# Patient Record
Sex: MALE | Race: WHITE | NOT HISPANIC OR LATINO | Employment: OTHER | URBAN - METROPOLITAN AREA
[De-identification: names, ages, dates, MRNs, and addresses within clinical notes are randomized per-mention and may not be internally consistent; named-entity substitution may affect disease eponyms.]

---

## 2019-05-31 ENCOUNTER — HOSPITAL ENCOUNTER (INPATIENT)
Facility: CLINIC | Age: 69
LOS: 1 days | Discharge: HOME | DRG: 392 | End: 2019-06-01
Attending: SURGERY | Admitting: SURGERY
Payer: COMMERCIAL

## 2019-05-31 ENCOUNTER — HOSPITAL ENCOUNTER (INPATIENT)
Facility: CLINIC | Age: 69
LOS: 1 days | Discharge: HOME | DRG: 392 | End: 2019-05-31
Attending: SURGERY | Admitting: SURGERY
Payer: COMMERCIAL

## 2019-05-31 VITALS
SYSTOLIC BLOOD PRESSURE: 184 MMHG | HEIGHT: 67 IN | OXYGEN SATURATION: 92 % | DIASTOLIC BLOOD PRESSURE: 88 MMHG | HEART RATE: 64 BPM | TEMPERATURE: 97.2 F | RESPIRATION RATE: 16 BRPM

## 2019-05-31 PROBLEM — R19.00 RETROPERITONEAL MASS: Status: ACTIVE | Noted: 2019-05-31

## 2019-05-31 LAB
ABO + RH BLD: NORMAL
ABO + RH BLD: NORMAL
ALBUMIN SERPL-MCNC: 3.8 G/DL (ref 3.4–5)
ALP SERPL-CCNC: 116 U/L (ref 40–150)
ALT SERPL W P-5'-P-CCNC: 34 U/L (ref 0–70)
ANION GAP SERPL CALCULATED.3IONS-SCNC: 3 MMOL/L (ref 3–14)
AST SERPL W P-5'-P-CCNC: 18 U/L (ref 0–45)
BILIRUB SERPL-MCNC: 0.5 MG/DL (ref 0.2–1.3)
BLD GP AB SCN SERPL QL: NORMAL
BLOOD BANK CMNT PATIENT-IMP: NORMAL
BUN SERPL-MCNC: 21 MG/DL (ref 7–30)
CALCIUM SERPL-MCNC: 9 MG/DL (ref 8.5–10.1)
CHLORIDE SERPL-SCNC: 110 MMOL/L (ref 94–109)
CO2 SERPL-SCNC: 29 MMOL/L (ref 20–32)
CREAT SERPL-MCNC: 1.12 MG/DL (ref 0.66–1.25)
ERYTHROCYTE [DISTWIDTH] IN BLOOD BY AUTOMATED COUNT: 13.3 % (ref 10–15)
GFR SERPL CREATININE-BSD FRML MDRD: 66 ML/MIN/{1.73_M2}
GLUCOSE SERPL-MCNC: 106 MG/DL (ref 70–99)
HCT VFR BLD AUTO: 41.8 % (ref 40–53)
HGB BLD-MCNC: 13.4 G/DL (ref 13.3–17.7)
INR PPP: 1.02 (ref 0.86–1.14)
MCH RBC QN AUTO: 32.2 PG (ref 26.5–33)
MCHC RBC AUTO-ENTMCNC: 32.1 G/DL (ref 31.5–36.5)
MCV RBC AUTO: 101 FL (ref 78–100)
PLATELET # BLD AUTO: 191 10E9/L (ref 150–450)
POTASSIUM SERPL-SCNC: 4.3 MMOL/L (ref 3.4–5.3)
PROT SERPL-MCNC: 7 G/DL (ref 6.8–8.8)
RBC # BLD AUTO: 4.16 10E12/L (ref 4.4–5.9)
SODIUM SERPL-SCNC: 142 MMOL/L (ref 133–144)
SPECIMEN EXP DATE BLD: NORMAL
WBC # BLD AUTO: 7.2 10E9/L (ref 4–11)

## 2019-05-31 PROCEDURE — 85027 COMPLETE CBC AUTOMATED: CPT | Performed by: STUDENT IN AN ORGANIZED HEALTH CARE EDUCATION/TRAINING PROGRAM

## 2019-05-31 PROCEDURE — 25800030 ZZH RX IP 258 OP 636: Performed by: STUDENT IN AN ORGANIZED HEALTH CARE EDUCATION/TRAINING PROGRAM

## 2019-05-31 PROCEDURE — 86901 BLOOD TYPING SEROLOGIC RH(D): CPT | Performed by: STUDENT IN AN ORGANIZED HEALTH CARE EDUCATION/TRAINING PROGRAM

## 2019-05-31 PROCEDURE — 80053 COMPREHEN METABOLIC PANEL: CPT | Performed by: STUDENT IN AN ORGANIZED HEALTH CARE EDUCATION/TRAINING PROGRAM

## 2019-05-31 PROCEDURE — 12000001 ZZH R&B MED SURG/OB UMMC

## 2019-05-31 PROCEDURE — 36415 COLL VENOUS BLD VENIPUNCTURE: CPT | Performed by: STUDENT IN AN ORGANIZED HEALTH CARE EDUCATION/TRAINING PROGRAM

## 2019-05-31 PROCEDURE — 86900 BLOOD TYPING SEROLOGIC ABO: CPT | Performed by: STUDENT IN AN ORGANIZED HEALTH CARE EDUCATION/TRAINING PROGRAM

## 2019-05-31 PROCEDURE — 85610 PROTHROMBIN TIME: CPT | Performed by: STUDENT IN AN ORGANIZED HEALTH CARE EDUCATION/TRAINING PROGRAM

## 2019-05-31 PROCEDURE — 86850 RBC ANTIBODY SCREEN: CPT | Performed by: STUDENT IN AN ORGANIZED HEALTH CARE EDUCATION/TRAINING PROGRAM

## 2019-05-31 PROCEDURE — 25000132 ZZH RX MED GY IP 250 OP 250 PS 637: Performed by: STUDENT IN AN ORGANIZED HEALTH CARE EDUCATION/TRAINING PROGRAM

## 2019-05-31 RX ORDER — ACETAMINOPHEN 325 MG/1
975 TABLET ORAL EVERY 8 HOURS PRN
Status: DISCONTINUED | OUTPATIENT
Start: 2019-05-31 | End: 2019-05-31 | Stop reason: HOSPADM

## 2019-05-31 RX ORDER — NALOXONE HYDROCHLORIDE 0.4 MG/ML
.1-.4 INJECTION, SOLUTION INTRAMUSCULAR; INTRAVENOUS; SUBCUTANEOUS
Status: DISCONTINUED | OUTPATIENT
Start: 2019-05-31 | End: 2019-05-31 | Stop reason: HOSPADM

## 2019-05-31 RX ORDER — ONDANSETRON 4 MG/1
4 TABLET, ORALLY DISINTEGRATING ORAL EVERY 6 HOURS PRN
Status: DISCONTINUED | OUTPATIENT
Start: 2019-05-31 | End: 2019-06-01 | Stop reason: HOSPADM

## 2019-05-31 RX ORDER — AMOXICILLIN 250 MG
2 CAPSULE ORAL 2 TIMES DAILY
Status: DISCONTINUED | OUTPATIENT
Start: 2019-05-31 | End: 2019-06-01 | Stop reason: HOSPADM

## 2019-05-31 RX ORDER — POLYETHYLENE GLYCOL 3350 17 G/17G
17 POWDER, FOR SOLUTION ORAL DAILY PRN
Status: DISCONTINUED | OUTPATIENT
Start: 2019-05-31 | End: 2019-06-01 | Stop reason: HOSPADM

## 2019-05-31 RX ORDER — ONDANSETRON 4 MG/1
4 TABLET, ORALLY DISINTEGRATING ORAL EVERY 6 HOURS PRN
Status: DISCONTINUED | OUTPATIENT
Start: 2019-05-31 | End: 2019-05-31 | Stop reason: HOSPADM

## 2019-05-31 RX ORDER — ONDANSETRON 2 MG/ML
4 INJECTION INTRAMUSCULAR; INTRAVENOUS EVERY 6 HOURS PRN
Status: DISCONTINUED | OUTPATIENT
Start: 2019-05-31 | End: 2019-06-01 | Stop reason: HOSPADM

## 2019-05-31 RX ORDER — PROCHLORPERAZINE 25 MG
12.5 SUPPOSITORY, RECTAL RECTAL EVERY 12 HOURS PRN
Status: DISCONTINUED | OUTPATIENT
Start: 2019-05-31 | End: 2019-06-01 | Stop reason: HOSPADM

## 2019-05-31 RX ORDER — ACETAMINOPHEN 325 MG/1
975 TABLET ORAL EVERY 8 HOURS PRN
Status: DISCONTINUED | OUTPATIENT
Start: 2019-05-31 | End: 2019-06-01 | Stop reason: HOSPADM

## 2019-05-31 RX ORDER — ONDANSETRON 2 MG/ML
4 INJECTION INTRAMUSCULAR; INTRAVENOUS EVERY 6 HOURS PRN
Status: DISCONTINUED | OUTPATIENT
Start: 2019-05-31 | End: 2019-05-31 | Stop reason: HOSPADM

## 2019-05-31 RX ORDER — PROCHLORPERAZINE MALEATE 5 MG
5 TABLET ORAL EVERY 6 HOURS PRN
Status: DISCONTINUED | OUTPATIENT
Start: 2019-05-31 | End: 2019-06-01 | Stop reason: HOSPADM

## 2019-05-31 RX ORDER — AMOXICILLIN 250 MG
1 CAPSULE ORAL 2 TIMES DAILY
Status: DISCONTINUED | OUTPATIENT
Start: 2019-05-31 | End: 2019-06-01 | Stop reason: HOSPADM

## 2019-05-31 RX ORDER — SODIUM CHLORIDE, SODIUM LACTATE, POTASSIUM CHLORIDE, CALCIUM CHLORIDE 600; 310; 30; 20 MG/100ML; MG/100ML; MG/100ML; MG/100ML
INJECTION, SOLUTION INTRAVENOUS CONTINUOUS
Status: DISCONTINUED | OUTPATIENT
Start: 2019-05-31 | End: 2019-06-01 | Stop reason: HOSPADM

## 2019-05-31 RX ORDER — PROCHLORPERAZINE 25 MG
12.5 SUPPOSITORY, RECTAL RECTAL EVERY 12 HOURS PRN
Status: DISCONTINUED | OUTPATIENT
Start: 2019-05-31 | End: 2019-05-31 | Stop reason: HOSPADM

## 2019-05-31 RX ORDER — OXCARBAZEPINE 300 MG/1
300 TABLET, FILM COATED ORAL 2 TIMES DAILY
Status: DISCONTINUED | OUTPATIENT
Start: 2019-06-01 | End: 2019-06-01

## 2019-05-31 RX ORDER — PROCHLORPERAZINE MALEATE 5 MG
5 TABLET ORAL EVERY 6 HOURS PRN
Status: DISCONTINUED | OUTPATIENT
Start: 2019-05-31 | End: 2019-05-31 | Stop reason: HOSPADM

## 2019-05-31 RX ORDER — SODIUM CHLORIDE, SODIUM LACTATE, POTASSIUM CHLORIDE, CALCIUM CHLORIDE 600; 310; 30; 20 MG/100ML; MG/100ML; MG/100ML; MG/100ML
INJECTION, SOLUTION INTRAVENOUS CONTINUOUS
Status: DISCONTINUED | OUTPATIENT
Start: 2019-05-31 | End: 2019-05-31 | Stop reason: HOSPADM

## 2019-05-31 RX ADMIN — SENNOSIDES AND DOCUSATE SODIUM 2 TABLET: 8.6; 5 TABLET ORAL at 22:30

## 2019-05-31 RX ADMIN — SODIUM CHLORIDE, POTASSIUM CHLORIDE, SODIUM LACTATE AND CALCIUM CHLORIDE: 600; 310; 30; 20 INJECTION, SOLUTION INTRAVENOUS at 22:32

## 2019-05-31 ASSESSMENT — ACTIVITIES OF DAILY LIVING (ADL)
COGNITION: 0 - NO COGNITION ISSUES REPORTED
DRESS: 0-->INDEPENDENT
SWALLOWING: 0-->SWALLOWS FOODS/LIQUIDS WITHOUT DIFFICULTY
BATHING: 0-->INDEPENDENT
RETIRED_EATING: 0-->INDEPENDENT
AMBULATION: 0-->INDEPENDENT
RETIRED_COMMUNICATION: 0-->UNDERSTANDS/COMMUNICATES WITHOUT DIFFICULTY
FALL_HISTORY_WITHIN_LAST_SIX_MONTHS: NO
TOILETING: 0-->INDEPENDENT
TRANSFERRING: 0-->INDEPENDENT

## 2019-06-01 VITALS
HEIGHT: 67 IN | OXYGEN SATURATION: 95 % | TEMPERATURE: 97 F | RESPIRATION RATE: 16 BRPM | HEART RATE: 57 BPM | SYSTOLIC BLOOD PRESSURE: 159 MMHG | DIASTOLIC BLOOD PRESSURE: 88 MMHG

## 2019-06-01 LAB
ANION GAP SERPL CALCULATED.3IONS-SCNC: 5 MMOL/L (ref 3–14)
BUN SERPL-MCNC: 20 MG/DL (ref 7–30)
CALCIUM SERPL-MCNC: 8.7 MG/DL (ref 8.5–10.1)
CHLORIDE SERPL-SCNC: 112 MMOL/L (ref 94–109)
CO2 SERPL-SCNC: 25 MMOL/L (ref 20–32)
CREAT SERPL-MCNC: 1.03 MG/DL (ref 0.66–1.25)
ERYTHROCYTE [DISTWIDTH] IN BLOOD BY AUTOMATED COUNT: 13.2 % (ref 10–15)
GFR SERPL CREATININE-BSD FRML MDRD: 73 ML/MIN/{1.73_M2}
GLUCOSE SERPL-MCNC: 122 MG/DL (ref 70–99)
HCT VFR BLD AUTO: 42.5 % (ref 40–53)
HGB BLD-MCNC: 13.1 G/DL (ref 13.3–17.7)
MAGNESIUM SERPL-MCNC: 2.2 MG/DL (ref 1.6–2.3)
MCH RBC QN AUTO: 31.8 PG (ref 26.5–33)
MCHC RBC AUTO-ENTMCNC: 30.8 G/DL (ref 31.5–36.5)
MCV RBC AUTO: 103 FL (ref 78–100)
PHOSPHATE SERPL-MCNC: 3.1 MG/DL (ref 2.5–4.5)
PLATELET # BLD AUTO: 173 10E9/L (ref 150–450)
POTASSIUM SERPL-SCNC: 3.7 MMOL/L (ref 3.4–5.3)
RBC # BLD AUTO: 4.12 10E12/L (ref 4.4–5.9)
SODIUM SERPL-SCNC: 143 MMOL/L (ref 133–144)
WBC # BLD AUTO: 5.4 10E9/L (ref 4–11)

## 2019-06-01 PROCEDURE — 85027 COMPLETE CBC AUTOMATED: CPT | Performed by: STUDENT IN AN ORGANIZED HEALTH CARE EDUCATION/TRAINING PROGRAM

## 2019-06-01 PROCEDURE — 83735 ASSAY OF MAGNESIUM: CPT | Performed by: STUDENT IN AN ORGANIZED HEALTH CARE EDUCATION/TRAINING PROGRAM

## 2019-06-01 PROCEDURE — 25800030 ZZH RX IP 258 OP 636: Performed by: STUDENT IN AN ORGANIZED HEALTH CARE EDUCATION/TRAINING PROGRAM

## 2019-06-01 PROCEDURE — 80048 BASIC METABOLIC PNL TOTAL CA: CPT | Performed by: STUDENT IN AN ORGANIZED HEALTH CARE EDUCATION/TRAINING PROGRAM

## 2019-06-01 PROCEDURE — 25000132 ZZH RX MED GY IP 250 OP 250 PS 637: Performed by: STUDENT IN AN ORGANIZED HEALTH CARE EDUCATION/TRAINING PROGRAM

## 2019-06-01 PROCEDURE — 84100 ASSAY OF PHOSPHORUS: CPT | Performed by: STUDENT IN AN ORGANIZED HEALTH CARE EDUCATION/TRAINING PROGRAM

## 2019-06-01 PROCEDURE — 36415 COLL VENOUS BLD VENIPUNCTURE: CPT | Performed by: STUDENT IN AN ORGANIZED HEALTH CARE EDUCATION/TRAINING PROGRAM

## 2019-06-01 RX ORDER — CLOPIDOGREL BISULFATE 75 MG/1
75 TABLET ORAL DAILY
Status: DISCONTINUED | OUTPATIENT
Start: 2019-06-01 | End: 2019-06-01 | Stop reason: HOSPADM

## 2019-06-01 RX ORDER — OXCARBAZEPINE 300 MG/1
300 TABLET, FILM COATED ORAL EVERY MORNING
COMMUNITY

## 2019-06-01 RX ORDER — PANTOPRAZOLE SODIUM 40 MG/1
40 TABLET, DELAYED RELEASE ORAL
Status: DISCONTINUED | OUTPATIENT
Start: 2019-06-01 | End: 2019-06-01 | Stop reason: HOSPADM

## 2019-06-01 RX ORDER — FINASTERIDE 5 MG/1
5 TABLET, FILM COATED ORAL DAILY
COMMUNITY

## 2019-06-01 RX ORDER — LISINOPRIL 20 MG/1
40 TABLET ORAL DAILY
COMMUNITY

## 2019-06-01 RX ORDER — ASPIRIN 81 MG/1
81 TABLET, CHEWABLE ORAL DAILY
COMMUNITY

## 2019-06-01 RX ORDER — ATORVASTATIN CALCIUM 40 MG/1
40 TABLET, FILM COATED ORAL DAILY
COMMUNITY

## 2019-06-01 RX ORDER — AMLODIPINE BESYLATE 5 MG/1
5 TABLET ORAL DAILY
Status: DISCONTINUED | OUTPATIENT
Start: 2019-06-01 | End: 2019-06-01 | Stop reason: HOSPADM

## 2019-06-01 RX ORDER — GABAPENTIN 300 MG/1
600-900 CAPSULE ORAL AT BEDTIME
COMMUNITY

## 2019-06-01 RX ORDER — FERROUS GLUCONATE 324(38)MG
324 TABLET ORAL DAILY
COMMUNITY
Start: 2017-06-12

## 2019-06-01 RX ORDER — CLOPIDOGREL BISULFATE 75 MG/1
75 TABLET ORAL DAILY
COMMUNITY

## 2019-06-01 RX ORDER — LISINOPRIL 20 MG/1
20 TABLET ORAL DAILY
Status: DISCONTINUED | OUTPATIENT
Start: 2019-06-01 | End: 2019-06-01 | Stop reason: HOSPADM

## 2019-06-01 RX ORDER — OXCARBAZEPINE 600 MG/1
1200 TABLET, FILM COATED ORAL AT BEDTIME
Status: DISCONTINUED | OUTPATIENT
Start: 2019-06-01 | End: 2019-06-01 | Stop reason: HOSPADM

## 2019-06-01 RX ORDER — OXCARBAZEPINE 300 MG/1
1200 TABLET, FILM COATED ORAL AT BEDTIME
COMMUNITY

## 2019-06-01 RX ORDER — METOPROLOL SUCCINATE 50 MG/1
50 TABLET, EXTENDED RELEASE ORAL DAILY
COMMUNITY
Start: 2019-01-16 | End: 2020-01-16

## 2019-06-01 RX ORDER — FAMOTIDINE 20 MG/1
20 TABLET, FILM COATED ORAL 2 TIMES DAILY
COMMUNITY

## 2019-06-01 RX ORDER — ATORVASTATIN CALCIUM 40 MG/1
40 TABLET, FILM COATED ORAL EVERY EVENING
Status: DISCONTINUED | OUTPATIENT
Start: 2019-06-01 | End: 2019-06-01 | Stop reason: HOSPADM

## 2019-06-01 RX ORDER — AMLODIPINE BESYLATE 5 MG/1
5 TABLET ORAL DAILY
COMMUNITY
End: 2023-04-20

## 2019-06-01 RX ORDER — ASPIRIN 81 MG/1
81 TABLET, CHEWABLE ORAL DAILY
Status: DISCONTINUED | OUTPATIENT
Start: 2019-06-01 | End: 2019-06-01 | Stop reason: HOSPADM

## 2019-06-01 RX ORDER — FUROSEMIDE 40 MG
40 TABLET ORAL DAILY
COMMUNITY
Start: 2015-09-10

## 2019-06-01 RX ORDER — LAMOTRIGINE 200 MG/1
400 TABLET ORAL DAILY
COMMUNITY

## 2019-06-01 RX ORDER — OXCARBAZEPINE 300 MG/1
300 TABLET, FILM COATED ORAL DAILY
Status: DISCONTINUED | OUTPATIENT
Start: 2019-06-01 | End: 2019-06-01 | Stop reason: HOSPADM

## 2019-06-01 RX ORDER — FLUTICASONE PROPIONATE 50 MCG
1 SPRAY, SUSPENSION (ML) NASAL DAILY PRN
COMMUNITY

## 2019-06-01 RX ADMIN — OXCARBAZEPINE 300 MG: 300 TABLET, FILM COATED ORAL at 00:49

## 2019-06-01 RX ADMIN — CLOPIDOGREL BISULFATE 75 MG: 75 TABLET, FILM COATED ORAL at 08:35

## 2019-06-01 RX ADMIN — PANTOPRAZOLE SODIUM 40 MG: 40 TABLET, DELAYED RELEASE ORAL at 08:34

## 2019-06-01 RX ADMIN — LISINOPRIL 20 MG: 20 TABLET ORAL at 08:35

## 2019-06-01 RX ADMIN — ASPIRIN 81 MG CHEWABLE TABLET 81 MG: 81 TABLET CHEWABLE at 08:34

## 2019-06-01 RX ADMIN — AMLODIPINE BESYLATE 5 MG: 5 TABLET ORAL at 08:35

## 2019-06-01 RX ADMIN — OXCARBAZEPINE 300 MG: 300 TABLET, FILM COATED ORAL at 08:35

## 2019-06-01 RX ADMIN — SENNOSIDES AND DOCUSATE SODIUM 2 TABLET: 8.6; 5 TABLET ORAL at 08:35

## 2019-06-01 RX ADMIN — SODIUM CHLORIDE, POTASSIUM CHLORIDE, SODIUM LACTATE AND CALCIUM CHLORIDE: 600; 310; 30; 20 INJECTION, SOLUTION INTRAVENOUS at 06:13

## 2019-06-01 ASSESSMENT — ACTIVITIES OF DAILY LIVING (ADL)
ADLS_ACUITY_SCORE: 11
ADLS_ACUITY_SCORE: 13

## 2019-06-01 NOTE — DISCHARGE SUMMARY
"Vital signs:  Temp: 97  F (36.1  C) Temp src: Oral BP: 159/88 Pulse: 57   Resp: 16 SpO2: 95 % O2 Device: None (Room air)   Height: 170.2 cm (5' 7\")    There is no height or weight on file to calculate BMI.    AVSS, hypertensive- home BP meds given & denies pain. Active BS, +flatus & reported blood in stool (MD aware). Discharge instruction given with follow- ups. Daughter coming to pick him up around 1515. Pt getting dressed.     Luis Pinto RN  "

## 2019-06-01 NOTE — CONSULTS
Surgical Oncology Consult     Osei Vogel MRN# [id number]   YOB: 1950 Age: 68 year old   Date of Admission:  5/31/2019    Requesting service/physician: Dr. Liu, General surgery  Reason for consult: Right retroperitoneal mass         Assessment:       Osei Vogel is a 68 year old male who presents with a largely asymptomatic right retroperitoneal mass that was noted incidentally during a work-up for hematochezia.  CT characteristics of this mass as well as its size is strongly suggestive of a lipoma versus a liposarcoma.  It is not likely that it is linked to his abdominal symptoms as these have been chronic in nature, the abdominal pain is on the contralateral side, and there is no evidence of small bowel or colonic irritation in any area adjacent to the mass.  Further work-up as an outpatient is indicated however no urgent intervention required at this time.         Plans:       Patient should be referred to surgical oncology clinic with Dr. Vital or Dr. Carr.    Decision on further work-up with MRI or biopsy can be deferred until that time.    Management of bowel symptoms (diarrhea, constipation, hematochezia) per general surgery.    Okay for discharge from surgical oncology standpoint    Patient discussed with Dr. Sharri Osborne - General/Surgical oncology chief resident - Pager: [phone]           History of Present Illness:     Osei Vogel is a 68 year old male with a past medical history significant for an MI in 2009 status post stents x7 in 3 vessels -currently on Plavix, hypertension, irritable bowel syndrome, hypertension, hyperlipidemia, and bipolar disorder who presents on transfer from the emergency department in [place] for assessment/management of a right retroperitoneal mass.  The patient initially presented to the emergency department after having hard stools and constipation which caused an isolated episode of mild hematochezia on the day prior to  admission.  He has chronic issues with diarrhea/constipation which he attributes to irritable bowel syndrome.  He does not have bleeding frequently or separate from bowel movements.  He denies any perianal/rectal pain.  The patient states that he last had a colonoscopy on 8/30/2017.  At that time he had a tubular adenoma in the descending colon and 3 hyperplastic polyps in the rectosigmoid area.  He was scheduled for a repeat colonoscopy in 5 years.   While in the emergency department in [place], the patient had a CTA abdomen/pelvis which demonstrated a mass in the right retroperitoneum extending from just below the kidney down into the proximal thigh.  It was measured approximately 15 x 11.3 x 27 cm.  He was referred to [facility] for further evaluation and management of this mass.   Of note, the patient states that over the last several years he has had intermittent pain and weakness in the right thigh which is causes leg to give out.  He denies any prior falls attributable to this weakness.  He is treated this with massage therapy and some physical therapy but has had no other formal work-up.  He has chronic mild back pain but does not notice any thigh or abdominal pain on the right side.           Past Medical History:     Patient Active Problem List   Diagnosis     Retroperitoneal mass             Past Surgical History:   No past surgical history on file.          Social History:     Social History     Socioeconomic History     Marital status:      Spouse name: Not on file     Number of children: Not on file     Years of education: Not on file     Highest education level: Not on file   Occupational History     Not on file   Social Needs     Financial resource strain: Not on file     Food insecurity:     Worry: Not on file     Inability: Not on file     Transportation needs:     Medical: Not on file     Non-medical: Not on file   Tobacco Use     Smoking status: Not on file   Substance and  Sexual Activity     Alcohol use: Not on file     Drug use: Not on file     Sexual activity: Not on file   Lifestyle     Physical activity:     Days per week: Not on file     Minutes per session: Not on file     Stress: Not on file   Relationships     Social connections:     Talks on phone: Not on file     Gets together: Not on file     Attends Orthodoxy service: Not on file     Active member of club or organization: Not on file     Attends meetings of clubs or organizations: Not on file     Relationship status: Not on file     Intimate partner violence:     Fear of current or ex partner: Not on file     Emotionally abused: Not on file     Physically abused: Not on file     Forced sexual activity: Not on file   Other Topics Concern     Not on file   Social History Narrative     Not on file             Family History:    his mother had breast cancer (age unknown), and there is some history of colon cancer in his paternal relatives which caused his father to have high risk screening regimen however his father has not had colon cancer before.  Patient has a roughly 40-year history of smoking though he stopped 7 years ago.  He smoked approximately 1 to 2 packs/day.           Allergies:      Allergies   Allergen Reactions     Animal Dander      Cat dander             Medications:        Review of your medicines      UNREVIEWED medicines. Ask your doctor about these medicines      Dose / Directions   ferrous gluconate 324 (38 Fe) MG tablet  Commonly known as:  FERGON      Dose:  324 mg  Take 324 mg by mouth daily  Refills:  0     furosemide 40 MG tablet  Commonly known as:  LASIX      Dose:  40 mg  Take 40 mg by mouth daily  Refills:  0     lamoTRIgine 200 MG tablet  Commonly known as:  LaMICtal      Dose:  400 mg  Take 400 mg by mouth daily  Refills:  0     metoprolol succinate ER 50 MG 24 hr tablet  Commonly known as:  TOPROL-XL      Dose:  50 mg  Take 50 mg by mouth daily  Refills:  0                 Review of Systems:  "  Constitutional: NEGATIVE for fever, chills, change in weight   Skin: NEGATIVE for worrisome rashes, moles or lesions   HEENT: NEGATIVE for vision changes, eye irritation, changes in hearing, voice, swallowing, epistaxis, rhinorrhea, or other ear, mouth and throat problems.  Resp: NEGATIVE for cough, SOB, difficulty breathing.  CV: NEGATIVE for chest pain, palpitations, notable arrythmias.   GI: NEGATIVE for nausea, vomiting, melena,  heartburn, or change in bowel habits. POSITIVE for abdominal pain, hematochezia,  : NEGATIVE for frequency, dysuria, or hematuria   MSK: NEGATIVE for significant arthralgias or myalgia   Neuro: NEGATIVE for weakness, dizziness, numbness, paresthesias, seizures or loss of consciousness.          Physical Exam:   /88 (BP Location: Right arm)   Pulse 57   Temp 97  F (36.1  C) (Oral)   Resp 16   Ht 1.702 m (5' 7\")   SpO2 95%   0 lbs 0 oz  Physical Exam:   General:  Adult  male alert and responsive, NAD.  HEENT:  Normocephalic, atraumatic, PERRLA, EOMI, No oral lesions, no erythema  Respiratory:  CTA bilaterally, no wheezes, crackles.  Cardiovascular:  RRR.  No obvious murmurs.  Abdomen:  Soft, obese abdomen. Mildly tender to palpation in the LUQ and LLQ. No pain in the RLQ.  Extremities:  Warm, dry without peripheral edema. Exam of the bilat lower extremities shows no palpable inguinal lymphadenopathy. Full aROM. 5/5 strength.          Labs:   CBC:   Recent Labs   Lab Test 06/01/19  0749   WBC 5.4   HGB 13.1*          BMP:   Recent Labs   Lab Test 06/01/19  0749      POTASSIUM 3.7   CHLORIDE 112*   CO2 25   BUN 20   CR 1.03   *       LFT:   Recent Labs   Lab Test 05/31/19  2213   AST 18   ALT 34   ALKPHOS 116   BILITOTAL 0.5   PROTTOTAL 7.0   ALBUMIN 3.8       Coags:   Recent Labs   Lab Test 06/01/19  0749 05/31/19  2213   INR  --  1.02    191            Imaging:   CTA Abd/Pelv from [place] 5/31/2019 - imaging available in PACS by " "searching patient's name - read is copied from CareEverywhere  \"Abdomen/pelvis:    The small bowel and colon are normal in caliber without obstruction.  There are  no findings to suggest active gastrointestinal bleeding on arterial and delayed  phases of imaging and there was no hyperattenuating material seen within the  bowel on the noncontrast images.    The liver is diffusely hypoattenuating consistent with hepatic steatosis.  No  focal intrahepatic lesions are seen.  The spleen, gallbladder, adrenal glands,  pancreas and kidneys are normal in appearance.    There is a large, incompletely visualized right retroperitoneal fat containing  mass but which also contains wispy areas of soft tissue and a small amount of  calcification superiorly.  This extends from just below the right para renal  fascia along the right iliopsoas and posterior to the right common femoral  artery and vein into the proximal thigh.  This mass is difficult to measure but  measures at least 15 x 11.3 x 27 cm.  This also exerts mass effect on  peritoneal structures including the bladder.    There is moderate atherosclerotic calcification of the abdominal aorta and  bilateral iliac arteries.  There is an infrarenal abdominal aortic aneurysm  measuring 3.3 cm in anteroposterior diameter and contains some areas of mural  thrombus.  There are single bilateral renal arteries.  The superior mesenteric,  celiac and inferior mesenteric arteries are widely patent.  There is no  retroperitoneal or mesenteric lymphadenopathy.    Bones:    There are multilevel degenerative changes of the spine.  There are no  suspicious osteolytic or osteoblastic lesions.    IMPRESSION:  1. No CT angiographic findings to suggest active gastrointestinal bleed.  2. There is a large 15 x 11.3 x 27 cm right retroperitoneal fat-containing mass  but which also contains wispy areas of soft tissue in a small may diff  calcifications superiorly.  Given the large size and " "location, a  retroperitoneal liposarcoma is in the differential diagnosis.  Surgical  consultation is recommended.  3. There is a 3.3 cm infrarenal abdominal aortic aneurysm.  4. Vascular atherosclerotic calcifications including atherosclerotic  calcification of the right coronary artery.  5. Other nonacute findings as described.\"    For assessment and plan please see above.    Concepcion Osborne   General Surgery Resident  Pager: [phone]  Pt reviewed with Dr. Harrell.   "

## 2019-06-01 NOTE — PROGRESS NOTES
"SPIRITUAL HEALTH SERVICES  SPIRITUAL ASSESSMENT Progress Note  [facility]B   ON-CALL VISIT    REFERRAL SOURCE: Hospital  request upon admission      I met with Osei and oriented him to [facility].    He identified no other spiritual or emotional needs at this time.    Osei and I explored his spiritual framework: \"I used to attend an [employer] for years, and now I have started attending a [employer]ational. I guess you could say I am in the middle of a spiritual transition.\" He identified attending services as an important spiritual practice and has missed \"not being able to attend the past couple of weeks.\" He also finds purpose and meaning being connected to [employer], a \"david-based political activist organization.\" Our visit was interrupted by one of his consulting providers and he found out he may be going home soon, \"maybe I can attend service tomorrow,\" and identified no other spiritual/emotional needs at this time. He is oriented as to how he can request ongoing  support through his nurse.    PLAN: [facility] remains available for ongoing support for the duration of patient's hospitalization.    Deloris Moran  Chaplain Resident  Pager [phone]    [facility] remains available 24/7 for emergent requests/referrals, either by having the switchboard page the on-call  or by entering an ASAP/STAT consult in Epic (this will also page the on-call ).    "

## 2019-06-01 NOTE — H&P
GENERAL SURGERY ADMISSION HISTORY & PHYSICAL   Osei Vogel MRN# [id number]   YOB: 1950 Age: 68 year old     Date of Admission: 5/31/2019    CC: Retroperitoneal mass     HPI: Osei Vogel is a 68 year old year old male with PMH of MI (2009) status post stent x7 on plavix, mild persistent asthma, GERD, IBS, HTN, HLD, and bipolar disorder who presents as a direct admission from [place] with newly found retroperitoneal mass. He was having difficulty passing a bowel movement yesterday evening due to constipation; this was associated with mild rectal bleeding. He reports a small amount dripping into the toilet and a smear on the paper. He presented to the ED in [place] and a CT abd/pelvis was performed which was positive for a large right-sided retroperitoneal mass (15x11.3x27 cm). He was then transferred here for definitive management. Upon interview, he is hemodynamically stable. He denies chest pain, sob, abdominal pain, melena. He does note increasing back pain over the last month (he suffers from chronic back pain). He denies any numbness or tingling, falls. Denies urinary symptoms.     REVIEW OF SYSTEMS: The remainder of the complete ROS was negative unless noted in the HPI. Denies visual changes, headache, sore throat, rhinorrhea, chest pain, sob, abdominal pain,fevers, night sweats, weight loss.     PAST MEDICAL HISTORY:   IBS  GERD   HTN  HLD   Bipolar disorder   CAD, MI s/p 7 stents   [place]   Mild persistent asthma     PAST SURGICAL HISTORY:   Tonsils/Adenoids  Open umbilical hernia repair with mesh (2018)    ALLERGIES:    Cat dander  Tree nuts    HOME MEDICATIONS:   Amlodipine  Aspirin   Lipitor   Vit D3  Plavix   Lasix   Gabapentin   Toprol XL   Lisinopril     SOCIAL HISTORY:   Former smoker 1ppd x45 years, quit 9 years ago   Alcohol: occasional   Retired   Lives in [facility]     FAMILY HISTORY: No family history on file.    PHYSICAL EXAMINATION:  /90   Pulse 64   Temp 97.7  F  "(36.5  C) (Oral)   Resp 16   Ht 1.702 m (5' 7\")   SpO2 95%       General: NAD, awake and alert   CV: Non-cyanotic   Pulm: No increased work of breathing on room air   Abd: soft, mildly distended, non-tender. Umbilical hernia repair intact.   : No cormier   Extremities: WWP without edema  Neuro: No focal deficits noted, patient moves all extremities spontaneously    LABS:  Recent Labs   Lab 05/31/19  2213   WBC 7.2   RBC 4.16*   HGB 13.4   HCT 41.8   *   MCH 32.2   MCHC 32.1   RDW 13.3          Recent Labs   Lab 05/31/19  2213      POTASSIUM 4.3   CHLORIDE 110*   CO2 29   BUN 21   CR 1.12   *   JOSE L 9.0       Recent Labs   Lab 05/31/19  2213   AST 18   ALT 34   ALKPHOS 116   BILITOTAL 0.5   ALBUMIN 3.8   INR 1.02       IMAGING:  CT abd/pelvis 5/31/19 viewable in PACs  Large right-sided retroperitoneal mass (15x11.3x27 cm)          IMPRESSION:  1. No CT angiographic findings to suggest active gastrointestinal bleed.  2. There is a large 15 x 11.3 x 27 cm right retroperitoneal fat-containing mass  but which also contains wispy areas of soft tissue in a small may diff  calcifications superiorly.  Given the large size and location, a  retroperitoneal liposarcoma is in the differential diagnosis.  Surgical  consultation is recommended.  3. There is a 3.3 cm infrarenal abdominal aortic aneurysm.  4. Vascular atherosclerotic calcifications including atherosclerotic  calcification of the right coronary artery.  5. Other nonacute findings as described.     A/P: Osei Vogel is a 68 year old male presenting as direct admission after incidental finding of large right-sided retroperitoneal mass on CT scan.     - Admit to general surgery under Dr. Salcido   - Regular diet, NPO at midnight (for consideration of biopsy tomorrow).    - IVF @ 100  - Pain Management: PRN tylenol   - Nausea Management: PRN zofran   - Bowel Regimen: Senna Docusate, Miralax   - Consults: Will consult surgical oncology to " see patient in the morning.   - Labs: STAT admission labs and AM labs   - Will order PTA meds (including plavix) after pharmacy medication history review.     Discussed with chief and staff.     Lennie Liu MD   Surgery PGY-1

## 2019-06-01 NOTE — DISCHARGE SUMMARY
GENERAL SURGERY DISCHARGE SUMMARY  Patient Name: Osei Vogel  MR#: [id number]  Date of Admission: 5/31/2019  9:19 PM  Date of Discharge: 6/1/2019  Discharging Physician: Dr. Pat Salcido    Discharge Diagnoses:  No diagnosis found.    Procedures Performed this admission:  None      Consultations:  MEDICATION HISTORY IP PHARMACY CONSULT  SURGICAL ONCOLOGY ADULT IP CONSULT    Brief HPI:  Osei Vogel is a 68 year old year old male with PMH of MI (2009) status post stent x7 on plavix, mild persistent asthma, GERD, IBS, HTN, HLD, and bipolar disorder who presents as a direct admission from [place] with newly found retroperitoneal mass. He was having difficulty passing a bowel movement yesterday evening due to constipation; this was associated with mild rectal bleeding. He reports a small amount dripping into the toilet and a smear on the paper. He presented to the ED in [place] and a CT abd/pelvis was performed which was positive for a large right-sided retroperitoneal mass (15x11.3x27 cm). He was then transferred here for definitive management. Upon interview, he is hemodynamically stable. He denies chest pain, sob, abdominal pain, melena. He does note increasing back pain over the last month (he suffers from chronic back pain). He denies any numbness or tingling, falls. Denies urinary symptoms.      Hospital Course:   Osei Vogel was admitted as a direct transfer from an outside hospital after he went to a local ED for rectal bleeding and was found to have a large retroperitoneal mass. He was seen and evaluated by Surgical Oncology who recommended outpatient follow up. Cardiopulmonary and renal status remained stable throughout the admission.     On day of discharge, he was tolerating a regular diet, ambulating, voiding spontaneously without difficulty, and pain was controlled with oral pain medications. The patient was discharged home in stable and improved condition. He will follow up with  "Surgical Oncolocyg clinic in 2-4 weeks.    Pathology:  None    Discharge Exam:  /88 (BP Location: Right arm)   Pulse 57   Temp 97  F (36.1  C) (Oral)   Resp 16   Ht 1.702 m (5' 7\")   SpO2 95%   General: Alert, in no acute distress.  Respiratory: Breathing comfortably.  Cardiovascular: Regular rate and rhythm.   Gastrointestinal: Soft, non-tender, non-distended  Extremities: no edema, wwp    Medications on Discharge:      Review of your medicines      CONTINUE these medicines which have NOT CHANGED      Dose / Directions   amLODIPine 5 MG tablet  Commonly known as:  NORVASC      Dose:  5 mg  Take 5 mg by mouth daily  Refills:  0     aspirin 81 MG chewable tablet  Commonly known as:  ASA      Dose:  81 mg  Take 81 mg by mouth daily  Refills:  0     atorvastatin 40 MG tablet  Commonly known as:  LIPITOR      Dose:  40 mg  Take 40 mg by mouth daily  Refills:  0     cholecalciferol 1000 units Tabs      Dose:  2000 Units  Take 2,000 Units by mouth daily  Refills:  0     clopidogrel 75 MG tablet  Commonly known as:  PLAVIX      Dose:  75 mg  Take 75 mg by mouth daily  Refills:  0     famotidine 20 MG tablet  Commonly known as:  PEPCID      Dose:  20 mg  Take 20 mg by mouth 2 times daily  Refills:  0     ferrous gluconate 324 (38 Fe) MG tablet  Commonly known as:  FERGON      Dose:  324 mg  Take 324 mg by mouth daily  Refills:  0     finasteride 5 MG tablet  Commonly known as:  PROSCAR      Dose:  5 mg  Take 5 mg by mouth daily  Refills:  0     fluticasone 100 MCG/ACT inhaler  Commonly known as:  ARNUITY ELLIPTA      Dose:  1 puff  Inhale 1 puff into the lungs daily  Refills:  0     fluticasone 50 MCG/ACT nasal spray  Commonly known as:  FLONASE      Dose:  1 spray  Spray 1 spray into both nostrils daily as needed for rhinitis or allergies  Refills:  0     furosemide 40 MG tablet  Commonly known as:  LASIX      Dose:  40 mg  Take 40 mg by mouth daily  Refills:  0     gabapentin 300 MG capsule  Commonly known as:  " NEURONTIN      Dose:  600-900 mg  Take 600-900 mg by mouth At Bedtime  Refills:  0     lamoTRIgine 200 MG tablet  Commonly known as:  LaMICtal      Dose:  400 mg  Take 400 mg by mouth daily  Refills:  0     lisinopril 20 MG tablet  Commonly known as:  PRINIVIL/ZESTRIL      Dose:  20 mg  Take 20 mg by mouth daily  Refills:  0     metoprolol succinate ER 50 MG 24 hr tablet  Commonly known as:  TOPROL-XL      Dose:  50 mg  Take 50 mg by mouth daily  Refills:  0     * OXcarbazepine 300 MG tablet  Commonly known as:  TRILEPTAL      Dose:  300 mg  Take 300 mg by mouth every morning  Refills:  0     * OXcarbazepine 300 MG tablet  Commonly known as:  TRILEPTAL      Dose:  1200 mg  Take 1,200 mg by mouth At Bedtime  Refills:  0         * This list has 2 medication(s) that are the same as other medications prescribed for you. Read the directions carefully, and ask your doctor or other care provider to review them with you.              Discharge Procedure Orders   Reason for your hospital stay   Order Comments: Large retroperitoneal mass     Adult [facility] Follow-up and recommended labs and tests   Order Comments: Follow up with Surgical Oncology 2-4 weeks to continue to work up and evaluate retroperitoneal mass.     Appointments on [place] and/or [facility] (with [facility] or [facility] provider or service). Call [phone] if you haven't heard regarding these appointments within 7 days of discharge.     Activity   Order Comments: Your activity upon discharge: activity as tolerated     Order Specific Question Answer Comments   Is discharge order? Yes      Discharge Instructions   Order Comments: May start general diet immediately.    Follow up in Surgical Oncology clinic in 2-4 weeks to continue to evaluate retroperitoneal mass.    Please call if you experience increasing abdominal pain, nausea, vomiting, increasing drainage from your wounds, chills, or fever >101.5.    If you have questions about your follow-up appointment,  please call the General Surgery Clinic at [phone].  Call [phone] and ask to speak with surgery resident if you are having troubles in the evenings, at night, or on weekends.     Diet   Order Comments: Follow this diet upon discharge: Orders Placed This Encounter      Regular Diet Adult     Order Specific Question Answer Comments   Is discharge order? Yes        Seen with staff on the day of discharge    Angel Verduzco, PGY-2  General Surgery

## 2019-06-01 NOTE — PLAN OF CARE
"/90   Pulse 64   Temp 97.7  F (36.5  C) (Oral)   Resp 16   Ht 1.702 m (5' 7\")   SpO2 95%     Activity: Up ad haider    Neuros: A&O x4, calls appropriately. CMS intact   Cardiac: Hypertensive w/ BPs in 160s. Pt states he did not take his BP meds in AM.   Respiratory: Pt denies shortness of breath or chest pain. 95% on RA. LS clear/diminished   GI/: Pt voiding spontaneously. Pt reports no BM since early morning of 5/31. -Bs, +Flatus  Diet: PT NPO for possible procedure in AM   Skin: WNL   Lines: L PIV infusing MIVF @ 100 mL/hr   Pain/nausea: Pt reports tolerable dull Lower left abdominal pain. No nausea   New changes this shift: Pt received Trileptal 300 mg during night  Plan: Continue plan of care     "

## 2019-06-01 NOTE — PHARMACY-ADMISSION MEDICATION HISTORY
Admission medication history interview status for the 5/31/2019 admission is complete. See Epic admission navigator for allergy information, pharmacy, prior to admission medications and immunization status.     Medication history interview sources:  Patient interview    Preferred home pharmacy: [facility] (in [facility]) [phone]    Changes made to PTA medication list (reason)  Added: Everything on this list  Deleted: NA  Changed: NA    Additional medication history information (including reliability of information, actions taken by pharmacist):  ++ Pt was a reliable historian and knew his medications well.      Prior to Admission medications    Medication Sig Last Dose Taking? Auth Provider   amLODIPine (NORVASC) 5 MG tablet Take 5 mg by mouth daily 5/30/2019 at Unknown Yes Unknown, Entered By History   aspirin (ASA) 81 MG chewable tablet Take 81 mg by mouth daily 5/30/2019 at Unknown Yes Unknown, Entered By History   atorvastatin (LIPITOR) 40 MG tablet Take 40 mg by mouth daily 5/30/2019 at Unknown Yes Unknown, Entered By History   cholecalciferol 1000 units TABS Take 2,000 Units by mouth daily 5/30/2019 at Unknown Yes Unknown, Entered By History   clopidogrel (PLAVIX) 75 MG tablet Take 75 mg by mouth daily 5/30/2019 at Unknown Yes Unknown, Entered By History   famotidine (PEPCID) 20 MG tablet Take 20 mg by mouth 2 times daily 5/30/2019 at Unknown Yes Unknown, Entered By History   ferrous gluconate (FERGON) 324 (38 Fe) MG tablet Take 324 mg by mouth daily 5/30/2019 at Unknown Yes Reported, Patient   finasteride (PROSCAR) 5 MG tablet Take 5 mg by mouth daily 5/30/2019 at Unknown Yes Unknown, Entered By History   fluticasone (ARNUITY ELLIPTA) 100 MCG/ACT inhaler Inhale 1 puff into the lungs daily 5/30/2019 at Unknown Yes Unknown, Entered By History   fluticasone (FLONASE) 50 MCG/ACT nasal spray Spray 1 spray into both nostrils daily as needed for rhinitis or allergies Unknown at Unknown Yes Unknown,  Entered By History   furosemide (LASIX) 40 MG tablet Take 40 mg by mouth daily 5/30/2019 at Unknown Yes Reported, Patient   gabapentin (NEURONTIN) 300 MG capsule Take 600-900 mg by mouth At Bedtime 5/29/2019 at Unknown Yes Unknown, Entered By History   lamoTRIgine (LAMICTAL) 200 MG tablet Take 400 mg by mouth daily 5/30/2019 at Unknown Yes Reported, Patient   lisinopril (PRINIVIL/ZESTRIL) 20 MG tablet Take 20 mg by mouth daily 5/30/2019 at Unknown Yes Unknown, Entered By History   metoprolol succinate ER (TOPROL-XL) 50 MG 24 hr tablet Take 50 mg by mouth daily 5/30/2019 at Unknown Yes Reported, Patient   OXcarbazepine (TRILEPTAL) 300 MG tablet Take 300 mg by mouth every morning  5/30/2019 at Unknown Yes Unknown, Entered By History   OXcarbazepine (TRILEPTAL) 300 MG tablet Take 1,200 mg by mouth At Bedtime 5/29/2019 at Unknown Yes Unknown, Entered By History         Medication history completed by:     Bob Teresa, PharmD, BCPS  June 1, 2019

## 2019-06-03 ENCOUNTER — PATIENT OUTREACH (OUTPATIENT)
Dept: SURGERY | Facility: CLINIC | Age: 69
End: 2019-06-03

## 2019-06-03 ENCOUNTER — TELEPHONE (OUTPATIENT)
Dept: ONCOLOGY | Facility: CLINIC | Age: 69
End: 2019-06-03

## 2019-06-03 NOTE — TELEPHONE ENCOUNTER
Scheduling per charles. 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     Is this appointment held on the schedule (Hold will be removed once appt is scheduled)?: No     Is there any additional testing/work up needed prior to visit? No     Has the patient been notified of diagnosis and appointment referral? yes     Were the records sent directly to clinic? Not certain status of records     Has patient been seen for any external appt for this diagnosis [facility]     ADDITIONAL INFORMATION:     ----- Message -----   From: Keli Roca RN   Sent: 6/3/2019   9:59 AM   To: Mercy Feldman RN, *   Subject: Hospital discharge                               Good morning,     I am reviewing this patients chart and it was recommended that the patient consult with Surgical Oncology within the next 2-4 weeks regarding right retroperitoneal mass.     Thanks,   Keli Roca RN, BSN, CNOR, RNFA, CBCN

## 2019-06-03 NOTE — PROGRESS NOTES
RN Post-Op/Post-Discharge Care Coordination Note    Mr. Osei Vogel is a 68 year old male who was discharged from the hospital recently.  He was evaluated for retroperitoneal mass.  Spoke with Patient.    Support  Patient able to care for self independently     Health Status  Fevers/chills: Patient denies any fever or chills.  Nausea/Vomiting: Patient denies nausea/vomiting.  Eating/drinking: Patient is able to eat and drink without any complaints.  Bowel habits: Patient reports having a normal bowel movement. No bleeding.  Drains (CARMEN): N/A  Incisions: No surgery done.    Pain: Abdominal ache- not requiring any analgesics.  New Medications:  None. Resume home medications    Activity/Restrictions  No restrictions    Equipment  None    Pathology reviewed with patient:  N/A    Forms/Letters  No    All of his questions were answered.  He will call this office if he has any further questions and/or concerns.  A message was sent to the Surgical Oncology scheduling team to reach out to the patient.    Whom and When to Call  Patient acknowledges understanding of how to manage any medication changes and   when to seek medical care.     Patient advised that if after hour medical concerns arise to please call [phone] and choose option 4 to speak to the physician on call.

## 2019-06-05 ENCOUNTER — PRE VISIT (OUTPATIENT)
Dept: ONCOLOGY | Facility: CLINIC | Age: 69
End: 2019-06-05

## 2019-06-05 NOTE — TELEPHONE ENCOUNTER
RECORDS STATUS - ALL OTHER DIAGNOSIS      RECORDS RECEIVED FROM: [facility]   DATE RECEIVED: IN CE   NOTES STATUS DETAILS   OFFICE NOTE from referring provider YES CE   OFFICE NOTE from medical oncologist NA    DISCHARGE SUMMARY from hospital YES CE   DISCHARGE REPORT from the ER YES CE   OPERATIVE REPORT YES CE   MEDICATION LIST YES CE   CLINICAL TRIAL TREATMENTS TO DATE NA    LABS YES CE   PATHOLOGY REPORTS NA    ANYTHING RELATED TO DIAGNOSIS NA    GENONOMIC TESTING NA    TYPE:     IMAGING (NEED IMAGES & REPORT) YES    CT SCANS CT IN PACS   MRI NA    MAMMO NA    ULTRASOUND NA    PET NA

## 2019-06-08 ASSESSMENT — ENCOUNTER SYMPTOMS
SWOLLEN GLANDS: 0
ABDOMINAL PAIN: 1
NAIL CHANGES: 0
SPUTUM PRODUCTION: 1
DYSURIA: 0
SORE THROAT: 0
EYE PAIN: 0
ARTHRALGIAS: 1
DYSPNEA ON EXERTION: 1
POLYPHAGIA: 1
MYALGIAS: 0
NECK MASS: 1
MUSCLE CRAMPS: 0
SMELL DISTURBANCE: 0
DIFFICULTY URINATING: 0
HEMATURIA: 0
SINUS PAIN: 0
NIGHT SWEATS: 0
POSTURAL DYSPNEA: 0
POOR WOUND HEALING: 0
HYPERTENSION: 1
BLOATING: 1
DECREASED APPETITE: 0
TASTE DISTURBANCE: 0
HEARTBURN: 1
SKIN CHANGES: 1
STIFFNESS: 0
INCREASED ENERGY: 0
WEIGHT LOSS: 0
JOINT SWELLING: 0
JAUNDICE: 0
SLEEP DISTURBANCES DUE TO BREATHING: 0
LEG PAIN: 1
EXERCISE INTOLERANCE: 0
COUGH: 1
DOUBLE VISION: 0
EYE REDNESS: 1
BACK PAIN: 1
SINUS CONGESTION: 1
TROUBLE SWALLOWING: 0
SYNCOPE: 0
WEIGHT GAIN: 0
HEMOPTYSIS: 0
ALTERED TEMPERATURE REGULATION: 0
NAUSEA: 0
HYPOTENSION: 0
BOWEL INCONTINENCE: 0
BRUISES/BLEEDS EASILY: 0
DIARRHEA: 1
PALPITATIONS: 0
CHILLS: 0
EYE WATERING: 1
CONSTIPATION: 1
BLOOD IN STOOL: 1
LIGHT-HEADEDNESS: 0
FLANK PAIN: 1
HALLUCINATIONS: 0
COUGH DISTURBING SLEEP: 0
SNORES LOUDLY: 1
POLYDIPSIA: 0
MUSCLE WEAKNESS: 0
RECTAL PAIN: 1
FATIGUE: 1
VOMITING: 0
EYE IRRITATION: 1
HOARSE VOICE: 0
ORTHOPNEA: 0
WHEEZING: 0
NECK PAIN: 1
FEVER: 0
SHORTNESS OF BREATH: 1

## 2019-06-14 ENCOUNTER — ONCOLOGY VISIT (OUTPATIENT)
Dept: ONCOLOGY | Facility: CLINIC | Age: 69
End: 2019-06-14
Attending: SURGERY
Payer: COMMERCIAL

## 2019-06-14 VITALS
TEMPERATURE: 98.4 F | HEIGHT: 67 IN | DIASTOLIC BLOOD PRESSURE: 91 MMHG | WEIGHT: 245.1 LBS | RESPIRATION RATE: 16 BRPM | BODY MASS INDEX: 38.47 KG/M2 | SYSTOLIC BLOOD PRESSURE: 197 MMHG | HEART RATE: 63 BPM | OXYGEN SATURATION: 95 %

## 2019-06-14 DIAGNOSIS — R19.00 RETROPERITONEAL MASS: Primary | ICD-10-CM

## 2019-06-14 PROCEDURE — G0463 HOSPITAL OUTPT CLINIC VISIT: HCPCS | Mod: ZF

## 2019-06-14 ASSESSMENT — MIFFLIN-ST. JEOR: SCORE: 1835.52

## 2019-06-14 ASSESSMENT — PAIN SCALES - GENERAL: PAINLEVEL: NO PAIN (0)

## 2019-06-14 NOTE — NURSING NOTE
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    Weight as of this encounter: 111.2 kg (245 lb 1.6 oz). Body surface area is 2.29 meters squared.  No Pain (0) Comment: Data Unavailable   No LMP for male patient.  Allergies reviewed: Yes  Medications reviewed: Yes    Medications: Medication refills not needed today.  Pharmacy name entered into EPIC: Data Unavailable    Clinical concerns: NONE        Abbey Israel, STEPHANIE              "

## 2019-06-14 NOTE — PROGRESS NOTES
HISTORY OF PRESENT ILLNESS:  Osei Vogel is a 68-year-old man who I was asked to see at the request of Dr. Salcido for evaluation of a retroperitoneal lipomatous mass.  He went to the [facility] with episode of hematochezia.  He had a CT scan which demonstrated a large lipomatous mass that extended just below the kidney on the right side and then went over the iliac crest and down the right thigh along the femoral vessels.  He was transferred to the [facility] and then discharged when he had no evidence of any bleeding.  His CT scan shows the mass to be about 27 cm in size.  It does have the characteristics of a benign lipoma or a well-differentiated liposarcoma.  The best I can tell from his history is he is asymptomatic from this mass.  He has had irritable bowel syndrome and it sounds like spinal stenosis in the past and has had some abdominal pain from this, but nothing consistent with his imaging.      PAST MEDICAL HISTORY:  Significant for myocardial infarction in 2009; he had 7 stents placed.  He has a history of hypertension, bipolar disease.  He had an umbilical hernia repair in the past.  On his CT, he is also noted to have a 3.3 cm infrarenal aortic aneurysm.      PHYSICAL EXAMINATION:  He is a well-appearing man, in no apparent distress.  He is obese.  His abdomen is soft, nondistended.  He does not really have any tenderness.  I cannot appreciate a palpable mass in his groin or upper thigh.      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  Differential diagnosis is either a benign lipoma or a well-differentiated liposarcoma.      PLAN:  I talked to him about 2 different strategies.  One would just be to remove this mass, if he is indeed symptomatic.  An alternative approach would be to do a CT-guided biopsy.  If a CT-guided biopsy shows lipoma with no evidence of liposarcoma then I think he could be simply observed a couple of times a year with CT scans.   If the image biopsy suggests a liposarcoma, then we would proceed with resection.  For his situation, resection of a liposarcoma is not without side effects, as I discussed with him, including a myocardial infarction, stroke and nerve injuries.  He wants to proceed with a needle biopsy and be observed if this does not show liposarcoma.      TT:  40 minutes.  CT:  30 minutes.      cc:   Pat Salcido MD   [facility] [address]      Sixto Fuller MD   [place] [phone]

## 2019-06-17 PROBLEM — R07.9 CHEST PAIN: Status: ACTIVE | Noted: 2017-06-11

## 2019-06-17 PROBLEM — D50.9 MICROCYTIC HYPOCHROMIC ANEMIA: Status: ACTIVE | Noted: 2017-06-12

## 2019-06-17 PROBLEM — I25.5 ISCHEMIC CARDIOMYOPATHY: Status: ACTIVE | Noted: 2017-06-12

## 2019-06-19 ENCOUNTER — DOCUMENTATION ONLY (OUTPATIENT)
Dept: INTERVENTIONAL RADIOLOGY/VASCULAR | Facility: CLINIC | Age: 69
End: 2019-06-19

## 2019-06-19 DIAGNOSIS — R19.00 RETROPERITONEAL MASS: Primary | ICD-10-CM

## 2019-06-20 NOTE — PROGRESS NOTES
I spoke with Thor today.  I informed him that he has the following appointments  Tuesday, July 2.  9 AM  Geena Douglass, [facility], first floor, Imaging Center, for a consult  Then he can take the shuttle to the hospital    You are scheduled for your biopsy on  Tuesday, July 2  Report to the [facility] Waiting room at 10:30 AM  The [facility] Waiting Room is located on the 2nd floor (street level) of the [address]  Your procedure is scheduled to start at approximately 12:00 Noon    No solid foods or milk products for 6 hours prior on the day of the procedure 6:00 AM  You may have clear liquids until 2 hours prior on the day of the procedure.(water, apple juice, broth, coffee or tea without milk or sugar, jell-o, white grape juice)  10:00 AM    He was instructed to stop his aspirin and plavix on June 27.    You will need a   (he is arranging for a )    Geena Douglass, CNS  Interventional Radiology

## 2019-06-26 ENCOUNTER — DOCUMENTATION ONLY (OUTPATIENT)
Dept: CARE COORDINATION | Facility: CLINIC | Age: 69
End: 2019-06-26

## 2019-06-28 ENCOUNTER — TELEPHONE (OUTPATIENT)
Dept: INTERVENTIONAL RADIOLOGY/VASCULAR | Facility: CLINIC | Age: 69
End: 2019-06-28

## 2019-06-30 ASSESSMENT — ENCOUNTER SYMPTOMS
DECREASED APPETITE: 0
ORTHOPNEA: 0
SHORTNESS OF BREATH: 1
RECTAL PAIN: 0
INSOMNIA: 1
SPUTUM PRODUCTION: 1
SLEEP DISTURBANCES DUE TO BREATHING: 0
JAUNDICE: 0
MYALGIAS: 0
SYNCOPE: 0
HEMOPTYSIS: 0
LEG PAIN: 1
EYE REDNESS: 1
BRUISES/BLEEDS EASILY: 1
STIFFNESS: 0
HYPERTENSION: 1
EYE WATERING: 1
COUGH DISTURBING SLEEP: 0
DYSPNEA ON EXERTION: 1
FLANK PAIN: 0
EYE IRRITATION: 1
SNORES LOUDLY: 1
WEIGHT GAIN: 0
EYE PAIN: 0
WEIGHT LOSS: 1
FATIGUE: 0
EXERCISE INTOLERANCE: 0
VOMITING: 0
DIARRHEA: 1
HEARTBURN: 0
POLYPHAGIA: 1
HEMATURIA: 0
ALTERED TEMPERATURE REGULATION: 0
BACK PAIN: 1
ABDOMINAL PAIN: 0
DYSURIA: 0
NECK PAIN: 1
MUSCLE CRAMPS: 0
ARTHRALGIAS: 1
POLYDIPSIA: 0
NIGHT SWEATS: 0
CONSTIPATION: 1
NAUSEA: 0
JOINT SWELLING: 1
SWOLLEN GLANDS: 1
LIGHT-HEADEDNESS: 0
POSTURAL DYSPNEA: 0
COUGH: 0
PALPITATIONS: 0
DEPRESSION: 0
MUSCLE WEAKNESS: 0
FEVER: 0
BLOATING: 0
PANIC: 0
INCREASED ENERGY: 0
DOUBLE VISION: 0
HALLUCINATIONS: 1
WHEEZING: 0
DIFFICULTY URINATING: 0
HYPOTENSION: 0
DECREASED CONCENTRATION: 0
CHILLS: 0
BOWEL INCONTINENCE: 0
NERVOUS/ANXIOUS: 0

## 2019-07-02 ENCOUNTER — APPOINTMENT (OUTPATIENT)
Dept: INTERVENTIONAL RADIOLOGY/VASCULAR | Facility: CLINIC | Age: 69
End: 2019-07-02
Attending: CLINICAL NURSE SPECIALIST
Payer: COMMERCIAL

## 2019-07-02 ENCOUNTER — APPOINTMENT (OUTPATIENT)
Dept: MEDSURG UNIT | Facility: CLINIC | Age: 69
End: 2019-07-02
Attending: RADIOLOGY
Payer: COMMERCIAL

## 2019-07-02 ENCOUNTER — OFFICE VISIT (OUTPATIENT)
Dept: INTERVENTIONAL RADIOLOGY/VASCULAR | Facility: CLINIC | Age: 69
End: 2019-07-02
Payer: COMMERCIAL

## 2019-07-02 ENCOUNTER — HOSPITAL ENCOUNTER (OUTPATIENT)
Facility: CLINIC | Age: 69
Discharge: HOME | End: 2019-07-02
Attending: RADIOLOGY | Admitting: RADIOLOGY
Payer: COMMERCIAL

## 2019-07-02 VITALS
HEART RATE: 58 BPM | SYSTOLIC BLOOD PRESSURE: 135 MMHG | WEIGHT: 234 LBS | BODY MASS INDEX: 36.64 KG/M2 | OXYGEN SATURATION: 97 % | DIASTOLIC BLOOD PRESSURE: 84 MMHG

## 2019-07-02 VITALS
SYSTOLIC BLOOD PRESSURE: 113 MMHG | RESPIRATION RATE: 16 BRPM | WEIGHT: 233.91 LBS | TEMPERATURE: 98.6 F | DIASTOLIC BLOOD PRESSURE: 64 MMHG | HEIGHT: 67 IN | BODY MASS INDEX: 36.71 KG/M2 | OXYGEN SATURATION: 98 % | HEART RATE: 55 BPM

## 2019-07-02 DIAGNOSIS — R19.00 RETROPERITONEAL MASS: Primary | ICD-10-CM

## 2019-07-02 DIAGNOSIS — R19.00 RETROPERITONEAL MASS: ICD-10-CM

## 2019-07-02 LAB
BASOPHILS # BLD AUTO: 0.1 10E9/L (ref 0–0.2)
BASOPHILS NFR BLD AUTO: 1 %
DIFFERENTIAL METHOD BLD: ABNORMAL
EOSINOPHIL # BLD AUTO: 0.4 10E9/L (ref 0–0.7)
EOSINOPHIL NFR BLD AUTO: 6 %
ERYTHROCYTE [DISTWIDTH] IN BLOOD BY AUTOMATED COUNT: 12.9 % (ref 10–15)
HCT VFR BLD AUTO: 43.2 % (ref 40–53)
HGB BLD-MCNC: 13.4 G/DL (ref 13.3–17.7)
IMM GRANULOCYTES # BLD: 0 10E9/L (ref 0–0.4)
IMM GRANULOCYTES NFR BLD: 0.3 %
INR PPP: 0.99 (ref 0.86–1.14)
LYMPHOCYTES # BLD AUTO: 1.6 10E9/L (ref 0.8–5.3)
LYMPHOCYTES NFR BLD AUTO: 28 %
MCH RBC QN AUTO: 31.9 PG (ref 26.5–33)
MCHC RBC AUTO-ENTMCNC: 31 G/DL (ref 31.5–36.5)
MCV RBC AUTO: 103 FL (ref 78–100)
MONOCYTES # BLD AUTO: 0.5 10E9/L (ref 0–1.3)
MONOCYTES NFR BLD AUTO: 9.1 %
NEUTROPHILS # BLD AUTO: 3.3 10E9/L (ref 1.6–8.3)
NEUTROPHILS NFR BLD AUTO: 55.6 %
NRBC # BLD AUTO: 0 10*3/UL
NRBC BLD AUTO-RTO: 0 /100
PLATELET # BLD AUTO: 190 10E9/L (ref 150–450)
RBC # BLD AUTO: 4.2 10E12/L (ref 4.4–5.9)
WBC # BLD AUTO: 5.9 10E9/L (ref 4–11)

## 2019-07-02 PROCEDURE — 25000128 H RX IP 250 OP 636: Performed by: PHYSICIAN ASSISTANT

## 2019-07-02 PROCEDURE — 00000468 ZZHCL STATISTIC CYTO QA-OR TOUCH APT TC 88333: Performed by: SURGERY

## 2019-07-02 PROCEDURE — 85610 PROTHROMBIN TIME: CPT | Performed by: PHYSICIAN ASSISTANT

## 2019-07-02 PROCEDURE — 49180 BIOPSY ABDOMINAL MASS: CPT

## 2019-07-02 PROCEDURE — 27210909 ZZH NEEDLE CR5

## 2019-07-02 PROCEDURE — 27210903 ZZH KIT CR5

## 2019-07-02 PROCEDURE — 88305 TISSUE EXAM BY PATHOLOGIST: CPT | Performed by: SURGERY

## 2019-07-02 PROCEDURE — 25800030 ZZH RX IP 258 OP 636: Performed by: PHYSICIAN ASSISTANT

## 2019-07-02 PROCEDURE — 25000125 ZZHC RX 250: Performed by: PHYSICIAN ASSISTANT

## 2019-07-02 PROCEDURE — 40000166 ZZH STATISTIC PP CARE STAGE 1

## 2019-07-02 PROCEDURE — 85025 COMPLETE CBC W/AUTO DIFF WBC: CPT | Performed by: PHYSICIAN ASSISTANT

## 2019-07-02 RX ORDER — FENTANYL CITRATE 50 UG/ML
25-50 INJECTION, SOLUTION INTRAMUSCULAR; INTRAVENOUS EVERY 5 MIN PRN
Status: DISCONTINUED | OUTPATIENT
Start: 2019-07-02 | End: 2019-07-02 | Stop reason: HOSPADM

## 2019-07-02 RX ORDER — SODIUM CHLORIDE 9 MG/ML
INJECTION, SOLUTION INTRAVENOUS CONTINUOUS
Status: DISCONTINUED | OUTPATIENT
Start: 2019-07-02 | End: 2019-07-02 | Stop reason: HOSPADM

## 2019-07-02 RX ORDER — NALOXONE HYDROCHLORIDE 0.4 MG/ML
.1-.4 INJECTION, SOLUTION INTRAMUSCULAR; INTRAVENOUS; SUBCUTANEOUS
Status: DISCONTINUED | OUTPATIENT
Start: 2019-07-02 | End: 2019-07-02 | Stop reason: HOSPADM

## 2019-07-02 RX ORDER — LIDOCAINE 40 MG/G
CREAM TOPICAL
Status: DISCONTINUED | OUTPATIENT
Start: 2019-07-02 | End: 2019-07-02 | Stop reason: HOSPADM

## 2019-07-02 RX ORDER — FLUMAZENIL 0.1 MG/ML
0.2 INJECTION, SOLUTION INTRAVENOUS
Status: DISCONTINUED | OUTPATIENT
Start: 2019-07-02 | End: 2019-07-02 | Stop reason: HOSPADM

## 2019-07-02 RX ADMIN — SODIUM CHLORIDE: 9 INJECTION, SOLUTION INTRAVENOUS at 12:15

## 2019-07-02 RX ADMIN — FENTANYL CITRATE 75 MCG: 50 INJECTION, SOLUTION INTRAMUSCULAR; INTRAVENOUS at 13:47

## 2019-07-02 RX ADMIN — MIDAZOLAM 2 MG: 1 INJECTION INTRAMUSCULAR; INTRAVENOUS at 13:48

## 2019-07-02 RX ADMIN — LIDOCAINE HYDROCHLORIDE 10 ML: 10 INJECTION, SOLUTION EPIDURAL; INFILTRATION; INTRACAUDAL; PERINEURAL at 13:48

## 2019-07-02 ASSESSMENT — MIFFLIN-ST. JEOR: SCORE: 1784.75

## 2019-07-02 NOTE — PROGRESS NOTES
Discharge teaching completed post retroperitoneal biopsy with all questions answered. IV removed with catheter intact. Biopsy site appears clean, dry, intact, and nontender. Pt takes PO food and water well and ambulates steady on feet with no complaints of pain or dizziness. Awaiting pt's sister for ride home.

## 2019-07-02 NOTE — PROCEDURES
Interventional Radiology Brief Post Procedure Note    Procedure: CT ABDOMEN RETROPERITONEAL BIOPSY    Proceduralist: Conrado Bennett MD    Assistant: None    Time Out: Prior to the start of the procedure and with procedural staff participation, I verbally confirmed the patient s identity using two indicators, relevant allergies, that the procedure was appropriate and matched the consent or emergent situation, and that the correct equipment/implants were available. Immediately prior to starting the procedure I conducted the Time Out with the procedural staff and re-confirmed the patient s name, procedure, and site/side. (The Joint Commission universal protocol was followed.)  Yes    Medications   Medication Event Details Admin User Admin Time       Sedation: IR Nurse Monitored Care   Post Procedure Summary:  Prior to the start of the procedure and with procedural staff participation, I verbally confirmed the patient s identity using two indicators, relevant allergies, that the procedure was appropriate and matched the consent or emergent situation, and that the correct equipment/implants were available. Immediately prior to starting the procedure I conducted the Time Out with the procedural staff and re-confirmed the patient s name, procedure, and site/side. (The Joint Commission universal protocol was followed.)  Yes       Sedatives: Fentanyl and Midazolam (Versed)    Vital signs, airway and pulse oximetry were monitored and remained stable throughout the procedure and sedation was maintained until the procedure was complete.  The patient was monitored by staff until sedation discharge criteria were met.    Patient tolerance: Patient tolerated the procedure well with no immediate complications.    Time of sedation in minutes: 15 Minutes minutes from beginning to end of physician one to one monitoring.          Findings: 5 cores taken from right retroperitoneal mass    Estimated Blood Loss: Minimal    Fluoroscopy Time:   minute(s)    SPECIMENS: Core needle biopsy specimens sent for pathological analysis    Complications: 1. None     Condition: Stable    Plan: prn    Comments: See dictated procedure note for full details.    Conrado Bennett MD

## 2019-07-02 NOTE — PROGRESS NOTES
"First Name: Osei   Age: 68 year old   Referring Physician: Dr. Vital  REASON FOR REFERRAL: Consult for biopsy of the right retroperitoneal mass    Assessment:  Sunny is a 68 year old with a significant cardiac history including an MI in 2009 with multiple cardiac stents placed the last in 2015.  He presented with abdominal pain to the ER and was found to have a large right retroperitoneal mass with the concern for lipoma vs liposarcoma.  Biopsy is requested.    Plan:  Image guided biopsy of right retroperitoneal mass (biopsy the wispy area, series 7, image 170)  Patient was instructed to hold aspirin and plavix for 5 days prior to the ibopsy  Blood work on the day of the biopsy    HPI: This is a patient who recently presented to the [place] ER with an episode of hematochezia.  He had a CT scan which demonstrated a large lipomatous mass that extended just below the kidney on the right side and then went over the iliac crest and down the right thigh along the femoral vessels.  He was transferred to the [facility] and then discharged when he had no evidence of any bleeding.  His CT scan shows the mass to be about 27 cm in size.  It does have the characteristics of a benign lipoma or a well-differentiated liposarcoma.  The patient was referred to Dr. Vital for further evaluation.    Dr. Richmond from Interventional Radiology reviewed the imaging, the mass is most fat appearing.  There is an area that contains \"wispiness\" that is amendable to biopsy, series 7, image 170.  The patient was made aware there is the potential for a false negative.  The patient has a significant PMH for MI in 2009, he has multiple cardiac stents in place and is on ASA and plavix.  Bipolar disorder, TUMT procedure for BPH, GERD, IBS, AZ with CPAP, Lina's syndrome, obesity, hypertension, hyperlipidemia, and essential tremor.    The patient tells me today that he does have intermittent abdominal pain, but nothing as severe " that brought him to the ER.  He also on occasion has right thigh pain, which if he walks long enough will make him want to rest because his leg gets tired.  PAST MEDICAL HISTORY:   Past Medical History:   Diagnosis Date     Bipolar disorder (H)      BPH (benign prostatic hyperplasia)      CAD (coronary artery disease)      Chemical dependency (H)     Mosly In remission (ongoing intermittent MJ); previous cocaine, hashish, LSD, heroin, leonora dust, THC. Used IV drugs x 1 with clean needle     GERD (gastroesophageal reflux disease)      IBS (irritable bowel syndrome)      Kidney stones      Myocardial infarction (H)      AZ (obstructive sleep apnea) 2016    started on CPAP in      Lina's syndrome (H)      Tuberculosis exposure 's    from a co-worker, received treatment     PAST SURGICAL HISTORY:   Past Surgical History:   Procedure Laterality Date     cardiac cath stent placement  2009    Percutaneous revacularization left circumflex artery with 2 drug-eluting stents     cardiac cath with stent placement Right 09/10/2015    coronary artery, x 2 stents     HERNIA REPAIR, UMBILICAL  2018     PROSTATE SURGERY  2010    for BPH     TONSILLECTOMY       FAMILY HISTORY:   Family History   Problem Relation Age of Onset     Diabetes Father         Type 2 lightly medicated     Coronary Artery Disease Father         triple bypass     Hypertension Father      Hyperlipidemia Father      Coronary Artery Disease Mother         triple bypass     Hypertension Mother      Hyperlipidemia Mother      Breast Cancer Mother         double mastectomy cured     Anxiety Disorder Daughter         Medcated mild dosage     SOCIAL HISTORY:   Social History     Tobacco Use     Smoking status: Former Smoker     Packs/day: 2.00     Years: 45.00     Pack years: 90.00     Last attempt to quit: 2012     Years since quittin.2     Smokeless tobacco: Never Used   Substance Use Topics     Alcohol use: Yes     Comment: Occ      PROBLEM LIST:   Patient Active Problem List    Diagnosis Date Noted     Retroperitoneal mass 05/31/2019     Priority: Medium     Ischemic cardiomyopathy 06/12/2017     Priority: Medium     Microcytic hypochromic anemia 06/12/2017     Priority: Medium     Chest pain 06/11/2017     Priority: Medium     Mild persistent asthma 12/13/2016     Priority: Medium     AZ (obstructive sleep apnea) 09/22/2016     Priority: Medium     Severe AZ, 14 cm H2O       GERD (gastroesophageal reflux disease) 06/22/2016     Priority: Medium     Lung nodules 06/22/2016     Priority: Medium     Bilateral 8mm in size       Shortness of breath on exertion 06/22/2016     Priority: Medium     CAD in native artery 01/07/2010     Priority: Medium     Prediabetes 03/25/2009     Priority: Medium     Essential tremor 06/01/2007     Priority: Medium     Essential and other specified forms of tremor (HRC)       Obesity 04/19/2005     Priority: Medium     Epic       Essential hypertension, benign 10/30/2003     Priority: Medium     Mixed hyperlipidemia 10/30/2003     Priority: Medium     Bipolar I disorder (H) 10/01/2003     Priority: Medium     MEDICATIONS:   Prescription Medications as of 7/2/2019       Rx Number Disp Refills Start End Last Dispensed Date Next Fill Date Owning Pharmacy    amLODIPine (NORVASC) 5 MG tablet            Sig: Take 5 mg by mouth daily    Class: Historical    Route: Oral    aspirin (ASA) 81 MG chewable tablet            Sig: Take 81 mg by mouth daily    Class: Historical    Route: Oral    atorvastatin (LIPITOR) 40 MG tablet            Sig: Take 40 mg by mouth daily    Class: Historical    Route: Oral    cholecalciferol 1000 units TABS            Sig: Take 2,000 Units by mouth daily    Class: Historical    Route: Oral    clopidogrel (PLAVIX) 75 MG tablet            Sig: Take 75 mg by mouth daily    Class: Historical    Route: Oral    famotidine (PEPCID) 20 MG tablet            Sig: Take 20 mg by mouth 2 times daily     Class: Historical    Route: Oral    ferrous gluconate (FERGON) 324 (38 Fe) MG tablet    6/12/2017        Sig: Take 324 mg by mouth daily    Class: Historical    Route: Oral    finasteride (PROSCAR) 5 MG tablet            Sig: Take 5 mg by mouth daily    Class: Historical    Route: Oral    fluticasone (ARNUITY ELLIPTA) 100 MCG/ACT inhaler            Sig: Inhale 1 puff into the lungs daily    Class: Historical    Route: Inhalation    fluticasone (FLONASE) 50 MCG/ACT nasal spray            Sig: Spray 1 spray into both nostrils daily as needed for rhinitis or allergies    Class: Historical    Route: Both Nostrils    furosemide (LASIX) 40 MG tablet    9/10/2015        Sig: Take 40 mg by mouth daily    Class: Historical    Route: Oral    gabapentin (NEURONTIN) 300 MG capsule            Sig: Take 600-900 mg by mouth At Bedtime    Class: Historical    Route: Oral    lamoTRIgine (LAMICTAL) 200 MG tablet            Sig: Take 400 mg by mouth daily    Class: Historical    Route: Oral    lisinopril (PRINIVIL/ZESTRIL) 20 MG tablet            Sig: Take 40 mg by mouth daily     Class: Historical    Route: Oral    metoprolol succinate ER (TOPROL-XL) 50 MG 24 hr tablet    1/16/2019 1/16/2020       Sig: Take 50 mg by mouth daily    Class: Historical    Route: Oral    OXcarbazepine (TRILEPTAL) 300 MG tablet            Sig: Take 300 mg by mouth every morning     Class: Historical    Route: Oral    OXcarbazepine (TRILEPTAL) 300 MG tablet            Sig: Take 1,200 mg by mouth At Bedtime    Class: Historical    Route: Oral        ALLERGIES: Cat hair extract; No clinical screening - see comments; Animal dander; and Nuts  VITALS: /84   Pulse 58   Wt 106.1 kg (234 lb)   SpO2 97%   BMI 36.64 kg/m      ROS:  Answers for HPI/ROS submitted by the patient on 6/30/2019   General Symptoms: Yes  Skin Symptoms: No  HENT Symptoms: No  EYE SYMPTOMS: Yes  HEART SYMPTOMS: Yes  LUNG SYMPTOMS: Yes  INTESTINAL SYMPTOMS: Yes  URINARY SYMPTOMS:  Yes  REPRODUCTIVE SYMPTOMS: No  SKELETAL SYMPTOMS: Yes  BLOOD SYMPTOMS: Yes  NERVOUS SYSTEM SYMPTOMS: No  MENTAL HEALTH SYMPTOMS: Yes  Fever: No  Loss of appetite: No  Weight loss: Yes  Weight gain: No  Fatigue: No  Night sweats: No  Chills: No  Increased stress: No  Excessive hunger: Yes  Excessive thirst: No  Feeling hot or cold when others believe the temperature is normal: No  Loss of height: No  Post-operative complications: No  Surgical site pain: No  Hallucinations: Yes  Change in or Loss of Energy: No  Hyperactivity: No  Confusion: Yes  Eye pain: No  Vision loss: No  Dry eyes: No  Watery eyes: Yes  Eye bulging: No  Double vision: No  Flashing of lights: No  Spots: No  Floaters: No  Redness: Yes  Crossed eyes: No  Tunnel Vision: No  Yellowing of eyes: No  Eye irritation: Yes  Cough: No  Sputum or phlegm: Yes  Coughing up blood: No  Difficulty breating or shortness of breath: Yes  Snoring: Yes  Wheezing: No  Difficulty breathing on exertion: Yes  Nighttime Cough: No  Difficulty breathing when lying flat: No  Chest pain or pressure: No  Fast or irregular heartbeat: No  Pain in legs with walking: Yes  Trouble breathing while lying down: No  Fingers or toes appear blue: No  High blood pressure: Yes  Low blood pressure: No  Fainting: No  Murmurs: No  Pacemaker: No  Varicose veins: No  Edema or swelling: Yes  Wake up at night with shortness of breath: No  Light-headedness: No  Exercise intolerance: No  Heart burn or indigestion: No  Nausea: No  Vomiting: No  Abdominal pain: No  Bloating: No  Constipation: Yes  Diarrhea: Yes  Black stools: No  Rectal or Anal pain: No  Fecal incontinence: No  Yellowing of skin or eyes: No  Vomit with blood: No  Change in stools: Yes  Trouble holding urine or incontinence: No  Pain or burning: No  Trouble starting or stopping: No  Increased frequency of urination: No  Blood in urine: No  Decreased frequency of urination: No  Frequent nighttime urination: No  Flank pain: No  Difficulty  emptying bladder: No  Back pain: Yes  Muscle aches: No  Neck pain: Yes  Swollen joints: Yes  Joint pain: Yes  Bone pain: No  Muscle cramps: No  Muscle weakness: No  Joint stiffness: No  Bone fracture: No  Anemia: Yes  Swollen glands: Yes  Easy bleeding or bruising: Yes  Nervous or Anxious: No  Depression: No  Trouble sleeping: Yes  Trouble thinking or concentrating: No  Mood changes: No  Panic attacks: No    Physical Examination: Vital signs are reviewed and they are stable  Constitutional: Pleasant, older gentleman, in no acute physical distress, came alone to his appt, says he has someone that will pick him up after his biopsy  Cardiovascular: negative, RRR  Respiratory: negative findings: normal respiratory rate and rhythm, lungs clear to auscultation  Musculoskeletal: extremities normal- no gross deformities noted, gait normal and normal muscle tone  Skin: no suspicious lesions or rashes  Neurologic: negative  Psychiatric: affect normal/bright, anxious and mentation appears normal.    BMP RESULTS:  Lab Results   Component Value Date     06/01/2019    POTASSIUM 3.7 06/01/2019    CHLORIDE 112 (H) 06/01/2019    CO2 25 06/01/2019    ANIONGAP 5 06/01/2019     (H) 06/01/2019    BUN 20 06/01/2019    CR 1.03 06/01/2019    GFRESTIMATED 73 06/01/2019    GFRESTBLACK 85 06/01/2019    JOSE L 8.7 06/01/2019        CBC RESULTS:  Lab Results   Component Value Date    WBC 5.4 06/01/2019    RBC 4.12 (L) 06/01/2019    HGB 13.1 (L) 06/01/2019    HCT 42.5 06/01/2019     (H) 06/01/2019    MCH 31.8 06/01/2019    MCHC 30.8 (L) 06/01/2019    RDW 13.2 06/01/2019     06/01/2019       INR/PTT:  Lab Results   Component Value Date    INR 1.02 05/31/2019       Diagnostic studies: see CT scan    PROVIDER NOTE:  I explained the procedure to Sunny  This included:  Preparing for the procedure, the actual procedure and recovery.  I explained the risks of the biopsy:  Bleeding, infection, hitting an unintended organ (vessel or  nerve)  I explained that usually the results return after two to four business days.    I explained that he/she would be contacted by Dr. Vital's office following this to determine a future plan.  Thank you for involving us in the care of your patient.    30 minutes was spent with Sunny.  25 minutes was spent in counseling.  Geena Douglass MS, APRN, CNS, CRN  Clinical Nurse Specialist  Interventional Radiology  [phone] (voice mail)  [phone] (pager)    CC  Patient Care Team:  Sixto Fuller as PCP - General (Family Practice)  Stephon Vital MD as MD (General Surgery)  Stephon Vital MD

## 2019-07-02 NOTE — IR NOTE
Patient Name: Osei Vogel  Medical Record Number: [id number]  Today's Date: 7/2/2019    Procedure: right retroperitoneal mass biopsy    Proceduralist: Dr. Bennett    Sedation start time: 1315  Sedation end time: 1330  Sedation medications administered: versed   2 Mg., fentanyl  75 Mcg.  Total sedation time: 15 minutes  Sedation Notes: All cores handed off to pathology.    Procedure start time: 1320  Puncture time: 1322  Procedure end time: 1330    Report given to: Regine Castillo RN    Other Notes: Pt arrived to IR room CT 2  from  Consent reviewed. Pt denies any questions or concerns regarding procedure. Pt positioned  supine  and monitored per protocol. Pt tolerated procedure without any noted complications. Pt transferred back to .

## 2019-07-02 NOTE — IP AVS SNAPSHOT
[facility], [place], Interventional Radiology  [address] [address]  Phone:  [phone]                                    After Visit Summary   7/2/2019    Osei Vogel    MRN: [id number]           After Visit Summary Signature Page    I have received my discharge instructions, and my questions have been answered. I have discussed any challenges I see with this plan with the nurse or doctor.    ..........................................................................................................................................  Patient/Patient Representative Signature      ..........................................................................................................................................  Patient Representative Print Name and Relationship to Patient    ..................................................               ................................................  Date                                   Time    ..........................................................................................................................................  Reviewed by Signature/Title    ...................................................              ..............................................  Date                                               Time          22EPIC Rev 08/18

## 2019-07-02 NOTE — IP AVS SNAPSHOT
MRN:[id number]                      After Visit Summary   7/2/2019    Osei Vogel    MRN: [id number]           Visit Information        Department      7/2/2019  9:39 AM [facility], [place], Interventional Radiology          Review of your medicines      UNREVIEWED medicines. Ask your doctor about these medicines       Dose / Directions   amLODIPine 5 MG tablet  Commonly known as:  NORVASC      Dose:  5 mg  Take 5 mg by mouth daily  Refills:  0     aspirin 81 MG chewable tablet  Commonly known as:  ASA      Dose:  81 mg  Take 81 mg by mouth daily  Refills:  0     atorvastatin 40 MG tablet  Commonly known as:  LIPITOR      Dose:  40 mg  Take 40 mg by mouth daily  Refills:  0     cholecalciferol 1000 units Tabs      Dose:  2000 Units  Take 2,000 Units by mouth daily  Refills:  0     clopidogrel 75 MG tablet  Commonly known as:  PLAVIX      Dose:  75 mg  Take 75 mg by mouth daily  Refills:  0     famotidine 20 MG tablet  Commonly known as:  PEPCID      Dose:  20 mg  Take 20 mg by mouth 2 times daily  Refills:  0     ferrous gluconate 324 (38 Fe) MG tablet  Commonly known as:  FERGON      Dose:  324 mg  Take 324 mg by mouth daily  Refills:  0     finasteride 5 MG tablet  Commonly known as:  PROSCAR      Dose:  5 mg  Take 5 mg by mouth daily  Refills:  0     fluticasone 100 MCG/ACT inhaler  Commonly known as:  ARNUITY ELLIPTA      Dose:  1 puff  Inhale 1 puff into the lungs daily  Refills:  0     fluticasone 50 MCG/ACT nasal spray  Commonly known as:  FLONASE      Dose:  1 spray  Spray 1 spray into both nostrils daily as needed for rhinitis or allergies  Refills:  0     furosemide 40 MG tablet  Commonly known as:  LASIX      Dose:  40 mg  Take 40 mg by mouth daily  Refills:  0     gabapentin 300 MG capsule  Commonly known as:  NEURONTIN      Dose:  600-900 mg  Take 600-900 mg by mouth At Bedtime  Refills:  0     lamoTRIgine 200 MG tablet  Commonly known as:  LaMICtal      Dose:  400 mg  Take 400 mg by  mouth daily  Refills:  0     lisinopril 20 MG tablet  Commonly known as:  PRINIVIL/ZESTRIL      Dose:  40 mg  Take 40 mg by mouth daily  Refills:  0     metoprolol succinate ER 50 MG 24 hr tablet  Commonly known as:  TOPROL-XL      Dose:  50 mg  Take 50 mg by mouth daily  Refills:  0     * OXcarbazepine 300 MG tablet  Commonly known as:  TRILEPTAL      Dose:  300 mg  Take 300 mg by mouth every morning  Refills:  0     * OXcarbazepine 300 MG tablet  Commonly known as:  TRILEPTAL      Dose:  1200 mg  Take 1,200 mg by mouth At Bedtime  Refills:  0         * This list has 2 medication(s) that are the same as other medications prescribed for you. Read the directions carefully, and ask your doctor or other care provider to review them with you.                  Protect others around you: Learn how to safely use, store and throw away your medicines at www.disposemymeds.org.       Follow-ups after your visit       Care Instructions       Further instructions from your care team       [facility]    Interventional Radiology  Patient Instructions Following Biopsy Of Right upper ABD     AFTER YOU GO HOME  ? If you were given sedation DO NOT drive or operate machinery at home or at work for at least 24 hours  ? DO relax and take it easy for 48 hours, no strenuous activity for 24 hours  ? DO drink plenty of fluids  ? DO resume your regular diet, unless otherwise instructed by your Primary Physician  ? Keep the dressing dry and in place for 24 hours.  ? DO NOT SMOKE FOR AT LEAST 24 HOURS, if you have been given any medications that were to help you relax or sedate you during your procedure  ? DO NOT drink alcoholic beverages the day of your procedure  ? DO NOT do any strenuous exercise or lifting (> 10 lbs) for at least 7 days following your procedure  ? DO NOT take a bath or shower for at least 12 hours following your procedure  ? Remove dressing after shower the next day. Replace with Band aid for 2 days.   Never leave a wet dressing in place.  ? DO NOT make any important or legal decisions for 24 hours following your procedure  ? There should be minimum drainage from the biopsy site    CALL THE PHYSICIAN IF:  ? You start bleeding from the procedure site.  If you do start to bleed from that site, lie down flat and hold pressure on the site for a minimum of 10 minutes.  Your physician will tell you if you need to return to the hospital  ? You develop nausea or vomiting  ? You have excessive swelling, redness, or tenderness at the site  ? You have drainage that looks like it is infected.  ? You experience severe pain  ? You develop hives or a rash or unexplained itching  ? You develop shortness of breath  ? You develop a temperature of 101 degrees F or greater  ? You develop bloody clots or red urine after you are discharged  ? You develop chest pain or cough up blood, lightheadedness or fainting    ADDITIONAL INSTRUCTION:    [facility] INTERVENTIONAL RADIOLOGY DEPARTMENT  Procedure Physician: Dr Bennett   Date of procedure: July 2, 2019  Telephone Numbers: [phone] Monday-Friday 8:00 am to 4:30 pm  [phone] After 4:30 pm Monday-Friday, Weekends & Holidays.   Ask for the Interventional Radiologist on call.  Someone is on call 24 hrs/day  [facility] toll free number: [phone] Monday-Friday 8:00 am to 4:30 pm  [facility] Emergency Dept: [phone]          Additional Information About Your Visit       MyChart Information    [employer] gives you secure access to your electronic health record. If you see a primary care provider, you can also send messages to your care team and make appointments. If you have questions, please call your primary care clinic.  If you do not have a primary care provider, please call [phone] and they will assist you.       Care EveryWhere ID    This is your Care EveryWhere ID. This could be used by other organizations to access your [place] medical records  [id number]       Your Vitals Were      "Blood Pressure   108/64 (BP Location: Left arm)          Pulse   50          Temperature   98.6  F (37  C) (Oral)          Respirations   16          Height   1.702 m (5' 7.01\")             Weight   106.1 kg (233 lb 14.5 oz)    Pulse Oximetry   97%    BMI (Body Mass Index)   36.63 kg/m           Primary Care Provider Office Phone # Fax #    Sixto Fuller [phone]      Equal Access to Services    [facility] Unit[facility] : Hadii aad ku hadasho Soomaali, waaxda luqadaha, qaybta kaalmada adeegyada, waxay idiin hayaan adehussein pradojayelda bañuelos . So [facility] [phone].    ATENCIÓN: Si henrietta stephen, tiene a boyce disposición servicios gratuitos de asistencia lingüística. Llame al [phone].    We comply with applicable federal civil rights laws and [place] laws. We do not discriminate on the basis of race, color, national origin, age, disability, sex, sexual orientation, or gender identity.           Thank you!    Thank you for choosing [facility] for your care. Our goal is always to provide you with excellent care. Hearing back from our patients is one way we can continue to improve our services. Please take a few minutes to complete the written survey that you may receive in the mail after you visit with us. Thank you!            Medication List      ASK your doctor about these medications          Morning Afternoon Evening Bedtime As Needed    amLODIPine 5 MG tablet  Also known as:  NORVASC  INSTRUCTIONS:  Take 5 mg by mouth daily                     aspirin 81 MG chewable tablet  Also known as:  ASA  INSTRUCTIONS:  Take 81 mg by mouth daily                     atorvastatin 40 MG tablet  Also known as:  LIPITOR  INSTRUCTIONS:  Take 40 mg by mouth daily                     cholecalciferol 1000 units Tabs  INSTRUCTIONS:  Take 2,000 Units by mouth daily                     clopidogrel 75 MG tablet  Also known as:  PLAVIX  INSTRUCTIONS:  Take 75 mg by mouth daily                     famotidine 20 MG tablet  Also known " as:  PEPCID  INSTRUCTIONS:  Take 20 mg by mouth 2 times daily                     ferrous gluconate 324 (38 Fe) MG tablet  Also known as:  FERGON  INSTRUCTIONS:  Take 324 mg by mouth daily                     finasteride 5 MG tablet  Also known as:  PROSCAR  INSTRUCTIONS:  Take 5 mg by mouth daily                     fluticasone 100 MCG/ACT inhaler  Also known as:  ARNUITY ELLIPTA  INSTRUCTIONS:  Inhale 1 puff into the lungs daily                     fluticasone 50 MCG/ACT nasal spray  Also known as:  FLONASE  INSTRUCTIONS:  Spray 1 spray into both nostrils daily as needed for rhinitis or allergies                     furosemide 40 MG tablet  Also known as:  LASIX  INSTRUCTIONS:  Take 40 mg by mouth daily                     gabapentin 300 MG capsule  Also known as:  NEURONTIN  INSTRUCTIONS:  Take 600-900 mg by mouth At Bedtime                     lamoTRIgine 200 MG tablet  Also known as:  LaMICtal  INSTRUCTIONS:  Take 400 mg by mouth daily                     lisinopril 20 MG tablet  Also known as:  PRINIVIL/ZESTRIL  INSTRUCTIONS:  Take 40 mg by mouth daily                     metoprolol succinate ER 50 MG 24 hr tablet  Also known as:  TOPROL-XL  INSTRUCTIONS:  Take 50 mg by mouth daily                     * OXcarbazepine 300 MG tablet  Also known as:  TRILEPTAL  INSTRUCTIONS:  Take 300 mg by mouth every morning                     * OXcarbazepine 300 MG tablet  Also known as:  TRILEPTAL  INSTRUCTIONS:  Take 1,200 mg by mouth At Bedtime                        * This list has 2 medication(s) that are the same as other medications prescribed for you. Read the directions carefully, and ask your doctor or other care provider to review them with you.

## 2019-07-02 NOTE — LETTER
"7/2/2019       RE: Osei Vogel  [address] [place] [address]     Dear Colleague,    Thank you for referring your patient, Osei Vogel, to the [facility] INTERVENTIONAL RADIOLOGY at [facility]. Please see a copy of my visit note below.    First Name: Osei   Age: 68 year old   Referring Physician: Dr. Vital  REASON FOR REFERRAL: Consult for biopsy of the right retroperitoneal mass    Assessment:  Sunny is a 68 year old with a significant cardiac history including an MI in 2009 with multiple cardiac stents placed the last in 2015.  He presented with abdominal pain to the ER and was found to have a large right retroperitoneal mass with the concern for lipoma vs liposarcoma.  Biopsy is requested.    Plan:  Image guided biopsy of right retroperitoneal mass (biopsy the wispy area, series 7, image 170)  Patient was instructed to hold aspirin and plavix for 5 days prior to the ibopsy  Blood work on the day of the biopsy    HPI: This is a patient who recently presented to the [place] ER with an episode of hematochezia.  He had a CT scan which demonstrated a large lipomatous mass that extended just below the kidney on the right side and then went over the iliac crest and down the right thigh along the femoral vessels.  He was transferred to the [facility] and then discharged when he had no evidence of any bleeding.  His CT scan shows the mass to be about 27 cm in size.  It does have the characteristics of a benign lipoma or a well-differentiated liposarcoma.  The patient was referred to Dr. Vital for further evaluation.    Dr. Richmond from Interventional Radiology reviewed the imaging, the mass is most fat appearing.  There is an area that contains \"wispiness\" that is amendable to biopsy, series 7, image 170.  The patient was made aware there is the potential for a false negative.  The patient has a significant PMH for MI in 2009, he has multiple " cardiac stents in place and is on ASA and plavix.  Bipolar disorder, TUMT procedure for BPH, GERD, IBS, AZ with CPAP, Lina's syndrome, obesity, hypertension, hyperlipidemia, and essential tremor.    The patient tells me today that he does have intermittent abdominal pain, but nothing as severe that brought him to the ER.  He also on occasion has right thigh pain, which if he walks long enough will make him want to rest because his leg gets tired.  PAST MEDICAL HISTORY:   Past Medical History:   Diagnosis Date     Bipolar disorder (H)      BPH (benign prostatic hyperplasia)      CAD (coronary artery disease)      Chemical dependency (H)     Mosly In remission (ongoing intermittent MJ); previous cocaine, hashish, LSD, heroin, leonora dust, THC. Used IV drugs x 1 with clean needle     GERD (gastroesophageal reflux disease)      IBS (irritable bowel syndrome)      Kidney stones      Myocardial infarction (H) 2009     AZ (obstructive sleep apnea) 2016    started on CPAP in 2016     Lina's syndrome (H)      Tuberculosis exposure 1908's    from a co-worker, received treatment     PAST SURGICAL HISTORY:   Past Surgical History:   Procedure Laterality Date     cardiac cath stent placement  11/2009    Percutaneous revacularization left circumflex artery with 2 drug-eluting stents     cardiac cath with stent placement Right 09/10/2015    coronary artery, x 2 stents     HERNIA REPAIR, UMBILICAL  06/2018     PROSTATE SURGERY  02/2010    for BPH     TONSILLECTOMY       FAMILY HISTORY:   Family History   Problem Relation Age of Onset     Diabetes Father         Type 2 lightly medicated     Coronary Artery Disease Father         triple bypass     Hypertension Father      Hyperlipidemia Father      Coronary Artery Disease Mother         triple bypass     Hypertension Mother      Hyperlipidemia Mother      Breast Cancer Mother         double mastectomy cured     Anxiety Disorder Daughter         Medcated mild dosage     SOCIAL  HISTORY:   Social History     Tobacco Use     Smoking status: Former Smoker     Packs/day: 2.00     Years: 45.00     Pack years: 90.00     Last attempt to quit: 2012     Years since quittin.2     Smokeless tobacco: Never Used   Substance Use Topics     Alcohol use: Yes     Comment: Occ     PROBLEM LIST:   Patient Active Problem List    Diagnosis Date Noted     Retroperitoneal mass 2019     Priority: Medium     Ischemic cardiomyopathy 2017     Priority: Medium     Microcytic hypochromic anemia 2017     Priority: Medium     Chest pain 2017     Priority: Medium     Mild persistent asthma 2016     Priority: Medium     AZ (obstructive sleep apnea) 2016     Priority: Medium     Severe AZ, 14 cm H2O       GERD (gastroesophageal reflux disease) 2016     Priority: Medium     Lung nodules 2016     Priority: Medium     Bilateral 8mm in size       Shortness of breath on exertion 2016     Priority: Medium     CAD in native artery 2010     Priority: Medium     Prediabetes 2009     Priority: Medium     Essential tremor 2007     Priority: Medium     Essential and other specified forms of tremor (HRC)       Obesity 2005     Priority: Medium     Epic       Essential hypertension, benign 10/30/2003     Priority: Medium     Mixed hyperlipidemia 10/30/2003     Priority: Medium     Bipolar I disorder (H) 10/01/2003     Priority: Medium     MEDICATIONS:   Prescription Medications as of 2019       Rx Number Disp Refills Start End Last Dispensed Date Next Fill Date Owning Pharmacy    amLODIPine (NORVASC) 5 MG tablet            Sig: Take 5 mg by mouth daily    Class: Historical    Route: Oral    aspirin (ASA) 81 MG chewable tablet            Sig: Take 81 mg by mouth daily    Class: Historical    Route: Oral    atorvastatin (LIPITOR) 40 MG tablet            Sig: Take 40 mg by mouth daily    Class: Historical    Route: Oral    cholecalciferol 1000 units  TABS            Sig: Take 2,000 Units by mouth daily    Class: Historical    Route: Oral    clopidogrel (PLAVIX) 75 MG tablet            Sig: Take 75 mg by mouth daily    Class: Historical    Route: Oral    famotidine (PEPCID) 20 MG tablet            Sig: Take 20 mg by mouth 2 times daily    Class: Historical    Route: Oral    ferrous gluconate (FERGON) 324 (38 Fe) MG tablet    6/12/2017        Sig: Take 324 mg by mouth daily    Class: Historical    Route: Oral    finasteride (PROSCAR) 5 MG tablet            Sig: Take 5 mg by mouth daily    Class: Historical    Route: Oral    fluticasone (ARNUITY ELLIPTA) 100 MCG/ACT inhaler            Sig: Inhale 1 puff into the lungs daily    Class: Historical    Route: Inhalation    fluticasone (FLONASE) 50 MCG/ACT nasal spray            Sig: Spray 1 spray into both nostrils daily as needed for rhinitis or allergies    Class: Historical    Route: Both Nostrils    furosemide (LASIX) 40 MG tablet    9/10/2015        Sig: Take 40 mg by mouth daily    Class: Historical    Route: Oral    gabapentin (NEURONTIN) 300 MG capsule            Sig: Take 600-900 mg by mouth At Bedtime    Class: Historical    Route: Oral    lamoTRIgine (LAMICTAL) 200 MG tablet            Sig: Take 400 mg by mouth daily    Class: Historical    Route: Oral    lisinopril (PRINIVIL/ZESTRIL) 20 MG tablet            Sig: Take 40 mg by mouth daily     Class: Historical    Route: Oral    metoprolol succinate ER (TOPROL-XL) 50 MG 24 hr tablet    1/16/2019 1/16/2020       Sig: Take 50 mg by mouth daily    Class: Historical    Route: Oral    OXcarbazepine (TRILEPTAL) 300 MG tablet            Sig: Take 300 mg by mouth every morning     Class: Historical    Route: Oral    OXcarbazepine (TRILEPTAL) 300 MG tablet            Sig: Take 1,200 mg by mouth At Bedtime    Class: Historical    Route: Oral        ALLERGIES: Cat hair extract; No clinical screening - see comments; Animal dander; and Nuts  VITALS: /84   Pulse 58    Wt 106.1 kg (234 lb)   SpO2 97%   BMI 36.64 kg/m       Physical Examination: Vital signs are reviewed and they are stable  Constitutional: Pleasant, older gentleman, in no acute physical distress, came alone to his appt, says he has someone that will pick him up after his biopsy  Cardiovascular: negative, RRR  Respiratory: negative findings: normal respiratory rate and rhythm, lungs clear to auscultation  Musculoskeletal: extremities normal- no gross deformities noted, gait normal and normal muscle tone  Skin: no suspicious lesions or rashes  Neurologic: negative  Psychiatric: affect normal/bright, anxious and mentation appears normal.    BMP RESULTS:  Lab Results   Component Value Date     06/01/2019    POTASSIUM 3.7 06/01/2019    CHLORIDE 112 (H) 06/01/2019    CO2 25 06/01/2019    ANIONGAP 5 06/01/2019     (H) 06/01/2019    BUN 20 06/01/2019    CR 1.03 06/01/2019    GFRESTIMATED 73 06/01/2019    GFRESTBLACK 85 06/01/2019    JOSE L 8.7 06/01/2019        CBC RESULTS:  Lab Results   Component Value Date    WBC 5.4 06/01/2019    RBC 4.12 (L) 06/01/2019    HGB 13.1 (L) 06/01/2019    HCT 42.5 06/01/2019     (H) 06/01/2019    MCH 31.8 06/01/2019    MCHC 30.8 (L) 06/01/2019    RDW 13.2 06/01/2019     06/01/2019       INR/PTT:  Lab Results   Component Value Date    INR 1.02 05/31/2019       Diagnostic studies: see CT scan    PROVIDER NOTE:  I explained the procedure to Sunny  This included:  Preparing for the procedure, the actual procedure and recovery.  I explained the risks of the biopsy:  Bleeding, infection, hitting an unintended organ (vessel or nerve)  I explained that usually the results return after two to four business days.    I explained that he/she would be contacted by Dr. Vital's office following this to determine a future plan.  Thank you for involving us in the care of your patient.    30 minutes was spent with Sunny.  25 minutes was spent in counseling.  Geena Douglass MS, APRN,  CNS, OLLIE  Clinical Nurse Specialist  Interventional Radiology  [phone] (voice mail)  [phone] (pager)    CC  Patient Care Team:  Sixto Fuller as PCP - General (Family Practice)  Stephon Vital MD as MD (General Surgery)  Stephon Vital MD

## 2019-07-02 NOTE — PROGRESS NOTES
Pt arrived on the unit via liter accompanied by nurse with NS infusing.Pt is bradycardic but good BP.O2 sats on RA 94.R abd upper quadrant dressing dry and intact and slight bruise on the side of dressing.

## 2019-07-02 NOTE — DISCHARGE INSTRUCTIONS
[facility]    Interventional Radiology  Patient Instructions Following Biopsy Of Right upper ABD     AFTER YOU GO HOME  ? If you were given sedation DO NOT drive or operate machinery at home or at work for at least 24 hours  ? DO relax and take it easy for 48 hours, no strenuous activity for 24 hours  ? DO drink plenty of fluids  ? DO resume your regular diet, unless otherwise instructed by your Primary Physician  ? Keep the dressing dry and in place for 24 hours.  ? DO NOT SMOKE FOR AT LEAST 24 HOURS, if you have been given any medications that were to help you relax or sedate you during your procedure  ? DO NOT drink alcoholic beverages the day of your procedure  ? DO NOT do any strenuous exercise or lifting (> 10 lbs) for at least 7 days following your procedure  ? DO NOT take a bath or shower for at least 12 hours following your procedure  ? Remove dressing after shower the next day. Replace with Band aid for 2 days.  Never leave a wet dressing in place.  ? DO NOT make any important or legal decisions for 24 hours following your procedure  ? There should be minimum drainage from the biopsy site    CALL THE PHYSICIAN IF:  ? You start bleeding from the procedure site.  If you do start to bleed from that site, lie down flat and hold pressure on the site for a minimum of 10 minutes.  Your physician will tell you if you need to return to the hospital  ? You develop nausea or vomiting  ? You have excessive swelling, redness, or tenderness at the site  ? You have drainage that looks like it is infected.  ? You experience severe pain  ? You develop hives or a rash or unexplained itching  ? You develop shortness of breath  ? You develop a temperature of 101 degrees F or greater  ? You develop bloody clots or red urine after you are discharged  ? You develop chest pain or cough up blood, lightheadedness or fainting    ADDITIONAL INSTRUCTION:    [facility] INTERVENTIONAL RADIOLOGY DEPARTMENT  Procedure  Physician: Dr Bennett   Date of procedure: July 2, 2019  Telephone Numbers: [phone] Monday-Friday 8:00 am to 4:30 pm  [phone] After 4:30 pm Monday-Friday, Weekends & Holidays.   Ask for the Interventional Radiologist on call.  Someone is on call 24 hrs/day  [facility] toll free number: [phone] Monday-Friday 8:00 am to 4:30 pm  [facility] Emergency Dept: [phone]

## 2019-07-02 NOTE — PROGRESS NOTES
Interventional Radiology Pre-Procedure Sedation Assessment   Time of Assessment: 12:32 PM    Expected Level: Moderate Sedation    Indication: Sedation is required for the following type of Procedure: Biopsy    Sedation and procedural consent: Risks, benefits and alternatives were discussed with Patient    PO Intake: Appropriately NPO for procedure    ASA Class: Class 2 - MILD SYSTEMIC DISEASE, NO ACUTE PROBLEMS, NO FUNCTIONAL LIMITATIONS.    Mallampati: Grade 2:  Soft palate, base of uvula, tonsillar pillars, and portion of posterior pharyngeal wall visible    Lungs: Lungs Clear with good breath sounds bilaterally    Heart: Normal heart sounds and rate    History and physical reviewed and no updates needed. I have reviewed the lab findings, diagnostic data, medications, and the plan for sedation. I have determined this patient to be an appropriate candidate for the planned sedation and procedure and have reassessed the patient IMMEDIATELY PRIOR to sedation and procedure.    Bernard Jerez PA-C  Interventional Radiology  [phone]

## 2019-07-02 NOTE — PROGRESS NOTES
Pt prepped for abd bx of mass.PIV started and IV fluids running.Consent signed.Family in waiting room.Labs pending.

## 2019-07-03 LAB — COPATH REPORT: NORMAL

## 2019-07-18 ENCOUNTER — TELEPHONE (OUTPATIENT)
Dept: INTERVENTIONAL RADIOLOGY/VASCULAR | Facility: CLINIC | Age: 69
End: 2019-07-18
Payer: COMMERCIAL

## 2019-07-18 NOTE — TELEPHONE ENCOUNTER
[facility] Call Center    Phone Message    May a detailed message be left on voicemail: yes    Reason for Call: Other: Pt had biopsy on 7/2 and hasn't heard anything regarding his results yet. He would like a call today if possible to go over everything, as he is very concerned. Thanks!     Action Taken: Message routed to:  [facility] ([facility]): Interventional Radiology

## 2019-07-25 ENCOUNTER — TELEPHONE (OUTPATIENT)
Dept: ONCOLOGY | Facility: CLINIC | Age: 69
End: 2019-07-25

## 2019-07-25 NOTE — TELEPHONE ENCOUNTER
Call to pt with message that Dr Vital has reviewed his results from CT guided retroperitoneal mass biopsy. The biopsy was non diagnostic. I told pt that Dr Vital believes this mass is benign and advises following with a CT in 6 month. Pt in agreement with this plan. CT  to be done Jan 2020.

## 2019-10-03 ENCOUNTER — HEALTH MAINTENANCE LETTER (OUTPATIENT)
Age: 69
End: 2019-10-03

## 2019-12-12 DIAGNOSIS — R19.00 RETROPERITONEAL MASS: Primary | ICD-10-CM

## 2019-12-13 DIAGNOSIS — R19.00 RETROPERITONEAL MASS: Primary | ICD-10-CM

## 2019-12-16 ENCOUNTER — HEALTH MAINTENANCE LETTER (OUTPATIENT)
Age: 69
End: 2019-12-16

## 2019-12-16 DIAGNOSIS — R19.00 RETROPERITONEAL MASS: Primary | ICD-10-CM

## 2019-12-26 ENCOUNTER — TRANSFERRED RECORDS (OUTPATIENT)
Dept: HEALTH INFORMATION MANAGEMENT | Facility: CLINIC | Age: 69
End: 2019-12-26

## 2020-06-05 ENCOUNTER — TRANSFERRED RECORDS (OUTPATIENT)
Dept: HEALTH INFORMATION MANAGEMENT | Facility: CLINIC | Age: 70
End: 2020-06-05

## 2020-06-05 ENCOUNTER — PATIENT OUTREACH (OUTPATIENT)
Dept: SURGERY | Facility: CLINIC | Age: 70
End: 2020-06-05

## 2020-06-05 LAB
ALT SERPL-CCNC: 19 U/L (ref 13–61)
AST SERPL-CCNC: 12 U/L (ref 10–37)
CREAT SERPL-MCNC: 1.2 MG/DL (ref 0.6–1.3)
GFR SERPL CREATININE-BSD FRML MDRD: 61 ML/MIN
GLUCOSE SERPL-MCNC: 105 MG/DL (ref 65–99)
POTASSIUM SERPL-SCNC: 3.9 MMOL/L (ref 3.6–5.1)

## 2020-06-05 NOTE — TELEPHONE ENCOUNTER
Surgical Oncology RN Care Coordination Note:     Returned call to patient regarding reports of lower abdominal pains and question of whether he needs follow up with Dr. Vital. Informed him that typically we would need to see him in person and have the CT scan that he is due for completed prior to evaluation. Informed him that if he is still in [place] that we would recommend establishing with a provider locally to assist with symptoms and complete further work up but that if he is back in [place] we would be happy to arragne follow up. Left my direct number for patient to call back and discuss as needed.     Alanis Lyn, RN, BSN  Care Coordinator   [phone]

## 2020-10-23 ENCOUNTER — TRANSFERRED RECORDS (OUTPATIENT)
Dept: HEALTH INFORMATION MANAGEMENT | Facility: CLINIC | Age: 70
End: 2020-10-23

## 2020-10-24 ENCOUNTER — TRANSFERRED RECORDS (OUTPATIENT)
Dept: HEALTH INFORMATION MANAGEMENT | Facility: CLINIC | Age: 70
End: 2020-10-24

## 2020-10-27 ENCOUNTER — TRANSFERRED RECORDS (OUTPATIENT)
Dept: HEALTH INFORMATION MANAGEMENT | Facility: CLINIC | Age: 70
End: 2020-10-27

## 2020-11-04 ENCOUNTER — PATIENT OUTREACH (OUTPATIENT)
Dept: SURGERY | Facility: CLINIC | Age: 70
End: 2020-11-04

## 2020-11-04 NOTE — TELEPHONE ENCOUNTER
Surgical Oncology RN Care Coordination Note:     Called and left a message for patient to call back and discuss obtaining outside records and plans for if he will be returning to [place] for care. Left my number for patient to call back.     Alanis Lyn RN, BSN  Care Coordinator

## 2020-11-19 ENCOUNTER — DOCUMENTATION ONLY (OUTPATIENT)
Dept: SURGERY | Facility: CLINIC | Age: 70
End: 2020-11-19

## 2020-11-19 NOTE — PROGRESS NOTES
Surgical Oncology RN Care Coordination Note:     Records summary:     CT 10/23/2020:   Retroperitoneum: There is a large predominately fatty mass within the right retroperitoneum and extends from the level of the inferior pole of the right kidney to the inguinal region. The mass contains nodular soft tissue and calcific foci and strand like densities. Its is incompletely visualized in the right proximal thigh. It measures more than 23 cm in longitudinal dimension. Largest cross sectional dimensions are 14.3 x 13.2 cm. There is leftward displacement of the inferior vena cava and the aortic bifurcation. The right iliac artery and vein are displaced anteriorly and medially.     Bowel/Mesentery: There is an apple core lesion involving the proximal transverse colon. It measures 6.4 x 2.6 cm and is suspicious for primary colon carcinoma. There is no bowel dilatation. No ascites or free air demonstrated. There is a small hiatal hernia. The appendix is normal.     Conclusion: Apple core lesion involving the proximal transverse colon, suspicious for primary colon carcinoma. Recommended colonoscopy for further evaluation.      Large predominately fatty mass involving the right retroperitoneum as described. 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 There has been little significant change compared to prior CT of 6/5/2020.        Message sent to records team to try and obtain further records and message sent to Dr. Vital regarding recommendation sent to patient to establish care locally for further work up ASAP if he has not already.       Alanis Lyn, RN, BSN  Care Coordinator

## 2021-01-15 ENCOUNTER — HEALTH MAINTENANCE LETTER (OUTPATIENT)
Age: 71
End: 2021-01-15

## 2021-09-05 ENCOUNTER — HEALTH MAINTENANCE LETTER (OUTPATIENT)
Age: 71
End: 2021-09-05

## 2022-02-20 ENCOUNTER — HEALTH MAINTENANCE LETTER (OUTPATIENT)
Age: 72
End: 2022-02-20

## 2022-10-23 ENCOUNTER — HEALTH MAINTENANCE LETTER (OUTPATIENT)
Age: 72
End: 2022-10-23

## 2023-04-02 ENCOUNTER — HEALTH MAINTENANCE LETTER (OUTPATIENT)
Age: 73
End: 2023-04-02

## 2023-04-03 ENCOUNTER — TRANSCRIBE ORDERS (OUTPATIENT)
Dept: OTHER | Age: 73
End: 2023-04-03

## 2023-04-03 DIAGNOSIS — R19.00 RETROPERITONEAL MASS: Primary | ICD-10-CM

## 2023-04-05 ENCOUNTER — PRE VISIT (OUTPATIENT)
Dept: ONCOLOGY | Facility: CLINIC | Age: 73
End: 2023-04-05
Payer: COMMERCIAL

## 2023-04-05 NOTE — TELEPHONE ENCOUNTER
RECORDS STATUS - ALL OTHER DIAGNOSIS      RECORDS RECEIVED FROM:   Dx. Retroperitoneal mass  All records since then are with [facility] (Dr. Radu Pierce)   DATE RECEIVED:    NOTES STATUS DETAILS   OFFICE NOTE from medical oncologist Internal     Care Everywhere  6/14/19 Stephon Lewis MD    [facility] Gastrointestinal Surgical Oncology   3/15/23 Radu Reynolds MD     OPERATIVE REPORT Care everywhere  [facility]  EGD: 1/16/23, 8/30/17  Colonoscopy: 8/30/17  Repair Umbilical Hernia, Mesh: 5/1/18    [facility]   EGD: 1/20/21     MEDICATION LIST Care everywhere     LABS     PATHOLOGY REPORTS Care everywhere  [facility] Dept of path  3/16/21 Soft tissue, right retroperitoneal / lower extremity, resection  Accession #: [id number]      IMAGING (NEED IMAGES & REPORT)     [facility] images  Reports: N/A *there are no original images at this location, all images were outside images sent to [facility] Images  Reports: care everywhere  [facility]: PACS  CT CHEST ABD PELVIS: 2/20/23      [address] [place] [address] Reports: scanned in Epic & in Care everywhere  Images: PACS  CT ABD PELVIS: 10/23/2020, 5/31/19    Images: PACS  CT ABD PELVIS: 4/7/21, 10/23/2020, 6/5/2020  MR ABD PELVIS: 2/12/21

## 2023-04-05 NOTE — TELEPHONE ENCOUNTER
Records needed:      ABDOMINAL IMAGING (CT SCANS, MRI, US, PET SCANS)  DATING BACK TO 2018.  - there are no images at [facility], images from [facility] are outside images sent to [facility]. Original images are at [facility] and [facility] images req 4/5/23      Records Requested     April 5, 2023 7:32 AM  [address] Images [facility] fx. [id number]    MRN: [id number]     Outcome Reports in Care everywhere, sent a fax for images to be pushed   4/6/23 11:27AM received images in PACS - Am      Records Requested     April 5, 2023 7:32 AM  [address] [place] [address] - [facility] Phone: [phone]  Medical records Phone: [phone] & Fax: [phone]   Outcome Reports in care everywhere, sent a req for imaging discs to be mailed out   Tracking #: [id number]    4/6/23 11:27AM received imaging disc, uploaded to PACS - Am

## 2023-04-20 ENCOUNTER — ONCOLOGY VISIT (OUTPATIENT)
Dept: RADIATION THERAPY | Facility: OUTPATIENT CENTER | Age: 73
End: 2023-04-20
Payer: COMMERCIAL

## 2023-04-20 VITALS
HEART RATE: 61 BPM | RESPIRATION RATE: 18 BRPM | SYSTOLIC BLOOD PRESSURE: 189 MMHG | WEIGHT: 208.2 LBS | OXYGEN SATURATION: 98 % | BODY MASS INDEX: 32.6 KG/M2 | DIASTOLIC BLOOD PRESSURE: 87 MMHG

## 2023-04-20 DIAGNOSIS — R19.00 RETROPERITONEAL MASS: Primary | ICD-10-CM

## 2023-04-20 ASSESSMENT — PAIN SCALES - GENERAL: PAINLEVEL: NO PAIN (0)

## 2023-04-20 NOTE — PROGRESS NOTES
HISTORY OF PRESENT ILLNESS:  Thor Vogel is 72-year-old man who presents for followup of a leiomyosarcoma.  I saw him in 06/2019.  At that time, he had a 27 cm lipomatous mass that was asymptomatic.  It was unclear whether or not this was a well-differentiated liposarcoma or lipoma.  He decided to have observation.  He was in [place] just during the pandemic and had a followup scan and eventually underwent surgery on 03/16/2021.  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  It was received in multiple fragments.  He did not receive any adjuvant therapy.  He was doing well and has been followed by his surgical oncologist at [facility].  His most recent CT scan was 02/20/2023.  Showed pneumonia and a slight asymmetry in the right retroperitoneal fat.  He does not have any evidence of any recurrence by imaging.  He had been following with his surgical oncologist by phone visit.  His last visit was 03/15/2023 and then a surgical oncologist from [facility] recommended followup back in [place].  He does have multiple medical problems including a AAA, coronary artery disease with cardiomyopathy.  He has had a stroke in the past.  He is followed by an oncologist in [place], Dr. Cunningham, for his Oncology. I have informed him today that there is no evidence of recurrence.  I would recommend a followup CT scan no more than once or twice a year.  These can be done in [place] and he can be followed with this by his current oncologist.  I can see him in the future if there is any evidence of radiographic progression.    Total time of 30 minutes which included reviewing his imaging and an in-person visit.

## 2023-04-20 NOTE — NURSING NOTE
"Oncology Rooming Note    April 20, 2023 12:21 PM   Osei Vogel is a 72 year old male who presents for:    Chief Complaint   Patient presents with     Oncology Clinic Visit     New Surgical Oncology Consult with Dr. Vital- Hx of Retroperitoneal mass      Initial Vitals: BP (!) 189/87   Pulse 61   Resp 18   Wt 94.4 kg (208 lb 3.2 oz)   SpO2 98%   BMI 32.60 kg/m   Estimated body mass index is 32.6 kg/m  as calculated from the following:    Height as of 7/2/19: 1.702 m (5' 7.01\").    Weight as of this encounter: 94.4 kg (208 lb 3.2 oz). Body surface area is 2.11 meters squared.  No Pain (0) Comment: Data Unavailable   No LMP for male patient.  Allergies reviewed: Yes  Medications reviewed: Yes    Medications: Medication refills not needed today.  Pharmacy name entered into [employer]: [id number] [place] [address]    Clinical concerns: New Surgical Oncology Consult- Hx of retroperitoneal mass Dr. Vital was notified.  Patient had high BP today, denies any chest pain or SOB. Patient was instructed to call his PCP to schedule a follow up regarding elevated BP.    Mireya Velez RN            "

## 2023-04-20 NOTE — LETTER
4/20/2023         RE: Osei Vogel  [address]  [place] [address]        Dear Colleague,    Thank you for referring your patient, Osei Vogel, to the [facility]. Please see a copy of my visit note below.    HISTORY OF PRESENT ILLNESS:  Thor Vogel is 72-year-old man who presents for followup of a leiomyosarcoma.  I saw him in 06/2019.  At that time, he had a 27 cm lipomatous mass that was asymptomatic.  It was unclear whether or not this was a well-differentiated liposarcoma or lipoma.  He decided to have observation.  He was in [place] just during the pandemic and had a followup scan and eventually underwent surgery on 03/16/2021.  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  It was received in multiple fragments.  He did not receive any adjuvant therapy.  He was doing well and has been followed by his surgical oncologist at [facility].  His most recent CT scan was 02/20/2023.  Showed pneumonia and a slight asymmetry in the right retroperitoneal fat.  He does not have any evidence of any recurrence by imaging.  He had been following with his surgical oncologist by phone visit.  His last visit was 03/15/2023 and then a surgical oncologist from [facility] recommended followup back in [place].  He does have multiple medical problems including a AAA, coronary artery disease with cardiomyopathy.  He has had a stroke in the past.  He is followed by an oncologist in [place], Dr. Cunningham, for his Oncology. I have informed him today that there is no evidence of recurrence.  I would recommend a followup CT scan no more than once or twice a year.  These can be done in [place] and he can be followed with this by his current oncologist.  I can see him in the future if there is any evidence of radiographic progression.    Total time of 30 minutes which included reviewing his imaging and an in-person  visit.    Again, thank you for allowing me to participate in the care of your patient.        Sincerely,        Stephon Vital MD

## 2023-11-16 ENCOUNTER — TRANSCRIBE ORDERS (OUTPATIENT)
Dept: OTHER | Age: 73
End: 2023-11-16

## 2023-11-16 DIAGNOSIS — U09.9 CHRONIC POST-COVID-19 SYNDROME: ICD-10-CM

## 2023-11-16 DIAGNOSIS — U09.9 POST COVID-19 CONDITION, UNSPECIFIED: Primary | ICD-10-CM

## 2024-01-01 NOTE — PATIENT INSTRUCTIONS
You are scheduled for your biopsy on Tuesday, July 2, 2019  Report to the [facility]ing room at 10:30 AM  The [facility]ing Room is located on the 2nd floor (street level) of the [address]  Your procedure is scheduled to start at approximately 12:00 Noon    No solid foods or milk products for 6 hours prior on the day of the procedure 6:00 AM  You may have clear liquids until 2 hours prior on the day of the procedure.(water, apple juice, broth, coffee or tea without milk or sugar, jell-o, white grape juice)  10:00 AM    You will need a     If you have any questions you may call the Radiology Nurse Line [phone]  
-Watery bowel movement or no bowel movement in 24 hours

## 2024-01-14 ENCOUNTER — HEALTH MAINTENANCE LETTER (OUTPATIENT)
Age: 74
End: 2024-01-14

## 2025-01-26 ENCOUNTER — HEALTH MAINTENANCE LETTER (OUTPATIENT)
Age: 75
End: 2025-01-26

## (undated) RX ORDER — FENTANYL CITRATE 50 UG/ML
INJECTION, SOLUTION INTRAMUSCULAR; INTRAVENOUS
Status: DISPENSED
Start: 2019-07-02

## (undated) RX ORDER — SODIUM CHLORIDE 9 MG/ML
INJECTION, SOLUTION INTRAVENOUS
Status: DISPENSED
Start: 2019-07-02

## (undated) RX ORDER — LIDOCAINE HYDROCHLORIDE 10 MG/ML
INJECTION, SOLUTION EPIDURAL; INFILTRATION; INTRACAUDAL; PERINEURAL
Status: DISPENSED
Start: 2019-07-02